# Patient Record
Sex: FEMALE | Race: WHITE | NOT HISPANIC OR LATINO | Employment: UNEMPLOYED
[De-identification: names, ages, dates, MRNs, and addresses within clinical notes are randomized per-mention and may not be internally consistent; named-entity substitution may affect disease eponyms.]

---

## 2017-11-09 ENCOUNTER — HOSPITAL (OUTPATIENT)
Dept: OTHER | Age: 47
End: 2017-11-09
Attending: FAMILY MEDICINE

## 2017-11-09 ENCOUNTER — IMAGING SERVICES (OUTPATIENT)
Dept: OTHER | Age: 47
End: 2017-11-09

## 2017-11-09 ENCOUNTER — CHARTING TRANS (OUTPATIENT)
Dept: OTHER | Age: 47
End: 2017-11-09

## 2017-11-09 ENCOUNTER — LAB SERVICES (OUTPATIENT)
Dept: OTHER | Age: 47
End: 2017-11-09

## 2017-11-09 ASSESSMENT — PAIN SCALES - GENERAL: PAINLEVEL_OUTOF10: 0

## 2017-11-10 LAB
25(OH)D3+25(OH)D2 SERPL-MCNC: 10.3 NG/ML (ref 30–100)
TSH SERPL-ACNC: 1 MCUNITS/ML (ref 0.35–5)

## 2017-11-11 LAB — TTG IGA SER IA-ACNC: 7 UNITS

## 2018-07-30 ENCOUNTER — OFFICE VISIT (OUTPATIENT)
Dept: OTOLARYNGOLOGY | Facility: CLINIC | Age: 48
End: 2018-07-30
Payer: MEDICARE

## 2018-07-30 VITALS
SYSTOLIC BLOOD PRESSURE: 101 MMHG | BODY MASS INDEX: 53.99 KG/M2 | HEIGHT: 56 IN | TEMPERATURE: 98 F | DIASTOLIC BLOOD PRESSURE: 63 MMHG | WEIGHT: 240 LBS

## 2018-07-30 DIAGNOSIS — H61.23 BILATERAL IMPACTED CERUMEN: Primary | ICD-10-CM

## 2018-07-30 PROCEDURE — 69210 REMOVE IMPACTED EAR WAX UNI: CPT | Performed by: OTOLARYNGOLOGY

## 2018-07-31 NOTE — PROGRESS NOTES
Brandy Louise is a 51year old female. Patient presents with:  Ear Problem: Bilateral ear cleaning    HPI:   She has Down syndrome with very narrow ear canals.   Hearing seems to be significantly diminished in the last few months    Current Outpatient Pr patients ears were examined under the operating microscope. Cerumen impaction was removed from bilateral ears using suction. Tympanic membranes were noted to be normal. Patient tolerated the procedure well. All questions were answered.   ASSESSMENT AND ARLENE

## 2018-11-02 VITALS
RESPIRATION RATE: 16 BRPM | HEIGHT: 56 IN | TEMPERATURE: 97.4 F | BODY MASS INDEX: 31.94 KG/M2 | DIASTOLIC BLOOD PRESSURE: 70 MMHG | HEART RATE: 61 BPM | SYSTOLIC BLOOD PRESSURE: 92 MMHG | WEIGHT: 142 LBS

## 2018-11-13 ENCOUNTER — CHARTING TRANS (OUTPATIENT)
Dept: OTHER | Age: 48
End: 2018-11-13

## 2018-11-13 ASSESSMENT — PAIN SCALES - GENERAL: PAINLEVEL_OUTOF10: 0

## 2018-12-07 VITALS
TEMPERATURE: 96.2 F | RESPIRATION RATE: 15 BRPM | HEART RATE: 75 BPM | OXYGEN SATURATION: 95 % | BODY MASS INDEX: 30.73 KG/M2 | WEIGHT: 136.6 LBS | HEIGHT: 56 IN | SYSTOLIC BLOOD PRESSURE: 98 MMHG | DIASTOLIC BLOOD PRESSURE: 68 MMHG

## 2019-01-01 ENCOUNTER — EXTERNAL RECORD (OUTPATIENT)
Dept: HEALTH INFORMATION MANAGEMENT | Facility: OTHER | Age: 49
End: 2019-01-01

## 2019-02-11 ENCOUNTER — LAB ENCOUNTER (OUTPATIENT)
Dept: LAB | Facility: HOSPITAL | Age: 49
End: 2019-02-11
Attending: FAMILY MEDICINE
Payer: MEDICARE

## 2019-02-11 ENCOUNTER — OFFICE VISIT (OUTPATIENT)
Dept: OTOLARYNGOLOGY | Facility: CLINIC | Age: 49
End: 2019-02-11
Payer: MEDICARE

## 2019-02-11 VITALS
DIASTOLIC BLOOD PRESSURE: 61 MMHG | HEIGHT: 56 IN | WEIGHT: 148 LBS | SYSTOLIC BLOOD PRESSURE: 108 MMHG | TEMPERATURE: 97 F | BODY MASS INDEX: 33.29 KG/M2

## 2019-02-11 DIAGNOSIS — H61.23 BILATERAL IMPACTED CERUMEN: Primary | ICD-10-CM

## 2019-02-11 DIAGNOSIS — J34.89 RHINORRHEA: ICD-10-CM

## 2019-02-11 DIAGNOSIS — E03.9 HYPOTHYROIDISM: Primary | ICD-10-CM

## 2019-02-11 LAB
T3FREE SERPL-MCNC: 3.42 PG/ML (ref 2.53–4.29)
T4 FREE SERPL-MCNC: 0.77 NG/DL (ref 0.58–1.64)
THYROPEROXIDASE AB SERPL-ACNC: 93.6 IU/ML (ref 0–9)
TSH SERPL-ACNC: 4.54 UIU/ML (ref 0.45–5.33)

## 2019-02-11 PROCEDURE — G0463 HOSPITAL OUTPT CLINIC VISIT: HCPCS | Performed by: OTOLARYNGOLOGY

## 2019-02-11 PROCEDURE — 86376 MICROSOMAL ANTIBODY EACH: CPT

## 2019-02-11 PROCEDURE — 86800 THYROGLOBULIN ANTIBODY: CPT

## 2019-02-11 PROCEDURE — 99213 OFFICE O/P EST LOW 20 MIN: CPT | Performed by: OTOLARYNGOLOGY

## 2019-02-11 PROCEDURE — 36415 COLL VENOUS BLD VENIPUNCTURE: CPT

## 2019-02-11 PROCEDURE — 84481 FREE ASSAY (FT-3): CPT

## 2019-02-11 PROCEDURE — 92504 EAR MICROSCOPY EXAMINATION: CPT | Performed by: OTOLARYNGOLOGY

## 2019-02-11 PROCEDURE — 84439 ASSAY OF FREE THYROXINE: CPT

## 2019-02-11 PROCEDURE — 84443 ASSAY THYROID STIM HORMONE: CPT

## 2019-02-11 NOTE — PROGRESS NOTES
Brian Sarabia is a 51year old female. Patient presents with:  Ear Wax: both ears    HPI:   Her ears seem to be blocked with wax once again. She has been having drainage from her nose which is been pretty constant.     Current Outpatient Medications:  C Posterior cervical. Supraclavicular.    Eyes Normal Conjunctiva - Right: Normal, Left: Normal. Pupil - Right: Normal, Left: Normal.    Ears Normal Inspection - Right: Normal, Left: Normal. Canal - Left: Normal. TM - Right: Normal, Left: Normal.  Ear canals

## 2019-02-14 LAB — THYROGLOBULIN AB: 68 IU/ML

## 2019-02-22 RX ORDER — QUINIDINE SULFATE 200 MG
TABLET ORAL
COMMUNITY
Start: 2018-11-28 | End: 2019-11-14 | Stop reason: DRUGHIGH

## 2019-11-14 ENCOUNTER — OFFICE VISIT (OUTPATIENT)
Dept: OTHER | Age: 49
End: 2019-11-14

## 2019-11-14 VITALS
WEIGHT: 136 LBS | HEART RATE: 84 BPM | OXYGEN SATURATION: 96 % | TEMPERATURE: 97.6 F | BODY MASS INDEX: 30.59 KG/M2 | SYSTOLIC BLOOD PRESSURE: 116 MMHG | DIASTOLIC BLOOD PRESSURE: 60 MMHG | HEIGHT: 56 IN

## 2019-11-14 DIAGNOSIS — E55.9 VITAMIN D DEFICIENCY: ICD-10-CM

## 2019-11-14 DIAGNOSIS — Z23 NEED FOR VACCINATION: ICD-10-CM

## 2019-11-14 DIAGNOSIS — I27.20 PULMONARY HYPERTENSION (CMD): ICD-10-CM

## 2019-11-14 DIAGNOSIS — Q90.9 DOWN SYNDROME: Primary | ICD-10-CM

## 2019-11-14 DIAGNOSIS — E03.8 HYPOTHYROIDISM DUE TO HASHIMOTO'S THYROIDITIS: ICD-10-CM

## 2019-11-14 DIAGNOSIS — E06.3 HYPOTHYROIDISM DUE TO HASHIMOTO'S THYROIDITIS: ICD-10-CM

## 2019-11-14 PROCEDURE — G0008 ADMIN INFLUENZA VIRUS VAC: HCPCS

## 2019-11-14 PROCEDURE — 99215 OFFICE O/P EST HI 40 MIN: CPT | Performed by: FAMILY MEDICINE

## 2019-11-14 PROCEDURE — 90686 IIV4 VACC NO PRSV 0.5 ML IM: CPT

## 2019-11-14 RX ORDER — KETOCONAZOLE 20 MG/ML
SHAMPOO TOPICAL
COMMUNITY
Start: 2016-11-08 | End: 2023-10-18 | Stop reason: DRUGHIGH

## 2019-11-14 RX ORDER — ERGOCALCIFEROL 1.25 MG/1
CAPSULE ORAL
COMMUNITY
End: 2019-11-14 | Stop reason: CLARIF

## 2019-11-14 ASSESSMENT — PATIENT HEALTH QUESTIONNAIRE - PHQ9
2. FEELING DOWN, DEPRESSED OR HOPELESS: NOT AT ALL
1. LITTLE INTEREST OR PLEASURE IN DOING THINGS: NOT AT ALL
SUM OF ALL RESPONSES TO PHQ9 QUESTIONS 1 AND 2: 0
SUM OF ALL RESPONSES TO PHQ9 QUESTIONS 1 AND 2: 0

## 2020-01-01 ENCOUNTER — EXTERNAL RECORD (OUTPATIENT)
Dept: HEALTH INFORMATION MANAGEMENT | Facility: OTHER | Age: 50
End: 2020-01-01

## 2020-01-27 ENCOUNTER — OFFICE VISIT (OUTPATIENT)
Dept: OTOLARYNGOLOGY | Facility: CLINIC | Age: 50
End: 2020-01-27
Payer: MEDICARE

## 2020-01-27 VITALS
HEIGHT: 56 IN | DIASTOLIC BLOOD PRESSURE: 60 MMHG | SYSTOLIC BLOOD PRESSURE: 98 MMHG | BODY MASS INDEX: 33.07 KG/M2 | WEIGHT: 147 LBS | TEMPERATURE: 98 F

## 2020-01-27 DIAGNOSIS — H61.23 BILATERAL IMPACTED CERUMEN: Primary | ICD-10-CM

## 2020-01-27 PROCEDURE — 69210 REMOVE IMPACTED EAR WAX UNI: CPT | Performed by: OTOLARYNGOLOGY

## 2020-01-27 NOTE — PROGRESS NOTES
Macie Bonner is a 47year old female.  Patient presents with:  Ear Problem: ear wax removal     HPI:   Her ears feel like they are blocked with wax  Current Outpatient Medications   Medication Sig Dispense Refill   • Clindamycin Phosphate 1 % External L Cerumen impaction was removed from bilateral ears using suction. Tympanic membranes were noted to be normal. Patient tolerated the procedure well. All questions were answered. ASSESSMENT AND PLAN:   1.  Bilateral impacted cerumen  Cerumen cleared bilatera

## 2020-07-10 ENCOUNTER — OFFICE VISIT (OUTPATIENT)
Dept: OTOLARYNGOLOGY | Facility: CLINIC | Age: 50
End: 2020-07-10
Payer: MEDICARE

## 2020-07-10 VITALS
HEIGHT: 56 IN | SYSTOLIC BLOOD PRESSURE: 93 MMHG | TEMPERATURE: 97 F | BODY MASS INDEX: 33.07 KG/M2 | DIASTOLIC BLOOD PRESSURE: 61 MMHG | WEIGHT: 147 LBS

## 2020-07-10 DIAGNOSIS — H61.23 BILATERAL IMPACTED CERUMEN: Primary | ICD-10-CM

## 2020-07-10 PROCEDURE — 69210 REMOVE IMPACTED EAR WAX UNI: CPT | Performed by: OTOLARYNGOLOGY

## 2020-07-11 NOTE — PROGRESS NOTES
Jarvis Roman is a 45year old female.  Patient presents with:  Ear Wax: both ears    HPI:   Her ears are very blocked up and her hearing is been very diminished over the last several weeks  Current Outpatient Medications   Medication Sig Dispense Refill tolerated the procedure well. All questions were answered. ASSESSMENT AND PLAN:   1. Bilateral impacted cerumen  Cerumen cleared bilaterally with immediate improvement in her ears.   Return as needed      The patient indicates understanding of these issue

## 2020-10-14 ENCOUNTER — HOSPITAL ENCOUNTER (OUTPATIENT)
Age: 50
Discharge: HOME | End: 2020-10-14
Payer: MEDICARE

## 2020-10-14 VITALS
RESPIRATION RATE: 16 BRPM | OXYGEN SATURATION: 100 % | SYSTOLIC BLOOD PRESSURE: 113 MMHG | HEART RATE: 68 BPM | DIASTOLIC BLOOD PRESSURE: 68 MMHG | TEMPERATURE: 98 F

## 2020-10-14 DIAGNOSIS — Z20.822 EXPOSURE TO COVID-19 VIRUS: Primary | ICD-10-CM

## 2020-10-14 DIAGNOSIS — Z20.822 ENCOUNTER FOR SCREENING LABORATORY TESTING FOR COVID-19 VIRUS IN ASYMPTOMATIC PATIENT: ICD-10-CM

## 2020-10-14 PROCEDURE — 99213 OFFICE O/P EST LOW 20 MIN: CPT | Performed by: NURSE PRACTITIONER

## 2020-10-14 PROCEDURE — 99204 OFFICE O/P NEW MOD 45 MIN: CPT | Performed by: NURSE PRACTITIONER

## 2020-10-15 NOTE — ED PROVIDER NOTES
Patient Seen in: [facility]      History   No chief complaint on file. Stated Complaint: Covid 19 test and flu shot    HPI    This is a 50-year-old female presenting for COVID-19 testing. Patient here with friend.   Fr She is alert and oriented to person, place, and time.    Psychiatric:         Mood and Affect: Mood normal.             ED Course     Labs Reviewed   2019 NOVEL CORONAVIRUS SARS-COV-2 BY PCR([facility])               MDM      Differential dx  covid 19  Exposed to

## 2020-11-18 ENCOUNTER — APPOINTMENT (OUTPATIENT)
Dept: OTHER | Age: 50
End: 2020-11-18

## 2020-12-15 ENCOUNTER — V-VISIT (OUTPATIENT)
Dept: OTHER | Age: 50
End: 2020-12-15

## 2020-12-15 ENCOUNTER — TELEPHONE (OUTPATIENT)
Dept: OTHER | Age: 50
End: 2020-12-15

## 2020-12-15 DIAGNOSIS — E03.8 HYPOTHYROIDISM DUE TO HASHIMOTO'S THYROIDITIS: ICD-10-CM

## 2020-12-15 DIAGNOSIS — Q90.9 DOWN SYNDROME: ICD-10-CM

## 2020-12-15 DIAGNOSIS — D75.89 MACROCYTOSIS: ICD-10-CM

## 2020-12-15 DIAGNOSIS — E06.3 HYPOTHYROIDISM DUE TO HASHIMOTO'S THYROIDITIS: ICD-10-CM

## 2020-12-15 DIAGNOSIS — E55.9 VITAMIN D DEFICIENCY: Primary | ICD-10-CM

## 2020-12-15 PROCEDURE — 99215 OFFICE O/P EST HI 40 MIN: CPT | Performed by: FAMILY MEDICINE

## 2020-12-15 RX ORDER — FLUTICASONE PROPIONATE 50 MCG
SPRAY, SUSPENSION (ML) NASAL
COMMUNITY
End: 2023-10-18 | Stop reason: DRUGHIGH

## 2020-12-15 SDOH — HEALTH STABILITY: MENTAL HEALTH: HOW OFTEN DO YOU HAVE A DRINK CONTAINING ALCOHOL?: NEVER

## 2020-12-15 ASSESSMENT — PATIENT HEALTH QUESTIONNAIRE - PHQ9
1. LITTLE INTEREST OR PLEASURE IN DOING THINGS: NOT AT ALL
SUM OF ALL RESPONSES TO PHQ9 QUESTIONS 1 AND 2: 0
CLINICAL INTERPRETATION OF PHQ2 SCORE: NO FURTHER SCREENING NEEDED
2. FEELING DOWN, DEPRESSED OR HOPELESS: NOT AT ALL
SUM OF ALL RESPONSES TO PHQ9 QUESTIONS 1 AND 2: 0
CLINICAL INTERPRETATION OF PHQ9 SCORE: NO FURTHER SCREENING NEEDED

## 2020-12-21 ENCOUNTER — TELEPHONE (OUTPATIENT)
Dept: OTHER | Age: 50
End: 2020-12-21

## 2021-01-01 ENCOUNTER — EXTERNAL RECORD (OUTPATIENT)
Dept: OTHER | Age: 51
End: 2021-01-01

## 2021-03-17 ENCOUNTER — TELEPHONE (OUTPATIENT)
Dept: OTHER | Age: 51
End: 2021-03-17

## 2021-06-18 LAB
25(OH)D3+25(OH)D2 SERPL-MCNC: 39.3 NG/ML (ref 30–100)
ALBUMIN SERPL-MCNC: 3.5 G/DL (ref 3.5–5.7)
ALP SERPL-CCNC: 50 U/L (ref 34–104)
ALT SERPL-CCNC: 13 U/L (ref 7–52)
ANION GAP SERPL CALC-SCNC: 4.8 MEQ/L (ref 6.2–14.7)
AST SERPL-CCNC: 17 U/L (ref 13–39)
BILIRUB SERPL-MCNC: 0.3 MG/DL (ref 0–1)
BUN SERPL-MCNC: 17 MG/DL (ref 7–25)
CALCIUM SERPL-MCNC: 8.5 MG/DL (ref 8.6–10.3)
CHLORIDE SERPL-SCNC: 101 MEQ/L (ref 98–107)
CO2 SERPL-SCNC: 31.2 MEQ/L (ref 21–31)
CREAT SERPL-MCNC: 0.8 MG/DL (ref 0.6–1.2)
FOLATE SERPL-MCNC: 18.1 NG/ML
GLUCOSE SERPL-MCNC: 78 MG/DL (ref 70–99)
LENGTH OF FAST TIME PATIENT: ABNORMAL H
POTASSIUM SERPL-SCNC: 4.4 MEQ/L (ref 3.5–5.2)
PROT SERPL-MCNC: 6.9 G/DL (ref 6.4–8.9)
SODIUM SERPL-SCNC: 137 MEQ/L (ref 133–144)
TSH SERPL-ACNC: 0.15 UIU/ML (ref 0.27–4.2)
VIT B12 SERPL-MCNC: 309 PG/ML (ref 180–914)

## 2021-06-21 ENCOUNTER — TELEPHONE (OUTPATIENT)
Dept: OTHER | Age: 51
End: 2021-06-21

## 2021-06-21 DIAGNOSIS — E05.90 HYPERTHYROIDISM: Primary | ICD-10-CM

## 2021-06-24 ENCOUNTER — TELEPHONE (OUTPATIENT)
Dept: OTHER | Age: 51
End: 2021-06-24

## 2021-09-01 PROBLEM — E06.1 THYROIDITIS, SUBACUTE: Status: ACTIVE | Noted: 2021-09-01

## 2021-09-02 ENCOUNTER — LAB ENCOUNTER (OUTPATIENT)
Dept: LAB | Facility: HOSPITAL | Age: 51
End: 2021-09-02
Attending: INTERNAL MEDICINE
Payer: MEDICARE

## 2021-09-02 DIAGNOSIS — E06.3 HASHIMOTO'S DISEASE: Primary | ICD-10-CM

## 2021-09-02 LAB
T3FREE SERPL-MCNC: 2.71 PG/ML (ref 2.4–4.2)
T4 FREE SERPL-MCNC: 0.8 NG/DL (ref 0.8–1.7)
TSI SER-ACNC: 4.08 MIU/ML (ref 0.36–3.74)

## 2021-09-02 PROCEDURE — 84481 FREE ASSAY (FT-3): CPT

## 2021-09-02 PROCEDURE — 84439 ASSAY OF FREE THYROXINE: CPT

## 2021-09-02 PROCEDURE — 36415 COLL VENOUS BLD VENIPUNCTURE: CPT

## 2021-09-02 PROCEDURE — 84443 ASSAY THYROID STIM HORMONE: CPT

## 2021-10-21 ENCOUNTER — LAB ENCOUNTER (OUTPATIENT)
Dept: LAB | Facility: HOSPITAL | Age: 51
End: 2021-10-21
Attending: INTERNAL MEDICINE
Payer: MEDICARE

## 2021-10-21 DIAGNOSIS — R94.6 NONSPECIFIC ABNORMAL RESULTS OF THYROID FUNCTION STUDY: Primary | ICD-10-CM

## 2021-10-21 PROCEDURE — 84443 ASSAY THYROID STIM HORMONE: CPT

## 2021-10-21 PROCEDURE — 84439 ASSAY OF FREE THYROXINE: CPT

## 2021-10-21 PROCEDURE — 84481 FREE ASSAY (FT-3): CPT

## 2021-10-21 PROCEDURE — 36415 COLL VENOUS BLD VENIPUNCTURE: CPT

## 2022-06-01 ENCOUNTER — TELEPHONE (OUTPATIENT)
Dept: OTHER | Age: 52
End: 2022-06-01

## 2022-06-01 DIAGNOSIS — U07.1 COVID-19 VIRUS INFECTION: Primary | ICD-10-CM

## 2022-06-03 PROBLEM — U07.1 COVID-19 VIRUS INFECTION: Status: ACTIVE | Noted: 2022-06-03

## 2022-06-16 ENCOUNTER — HOSPITAL ENCOUNTER (OUTPATIENT)
Dept: GENERAL RADIOLOGY | Facility: HOSPITAL | Age: 52
Discharge: HOME | End: 2022-06-16
Attending: FAMILY MEDICINE
Payer: MEDICARE

## 2022-06-16 DIAGNOSIS — M25.512 PAIN IN LEFT SHOULDER: ICD-10-CM

## 2022-06-16 PROCEDURE — 73030 X-RAY EXAM OF SHOULDER: CPT | Performed by: FAMILY MEDICINE

## 2022-10-12 ENCOUNTER — TELEPHONE (OUTPATIENT)
Dept: OTHER | Age: 52
End: 2022-10-12

## 2022-10-12 ENCOUNTER — OFFICE VISIT (OUTPATIENT)
Dept: OTHER | Age: 52
End: 2022-10-12

## 2022-10-12 VITALS
TEMPERATURE: 98.2 F | BODY MASS INDEX: 24.12 KG/M2 | HEART RATE: 62 BPM | OXYGEN SATURATION: 99 % | HEIGHT: 56 IN | WEIGHT: 107.2 LBS | SYSTOLIC BLOOD PRESSURE: 98 MMHG | DIASTOLIC BLOOD PRESSURE: 60 MMHG

## 2022-10-12 DIAGNOSIS — H60.332 ACUTE SWIMMER'S EAR OF LEFT SIDE: ICD-10-CM

## 2022-10-12 DIAGNOSIS — D75.89 MACROCYTOSIS: ICD-10-CM

## 2022-10-12 DIAGNOSIS — E03.8 HYPOTHYROIDISM DUE TO HASHIMOTO'S THYROIDITIS: ICD-10-CM

## 2022-10-12 DIAGNOSIS — Q90.9 DOWN SYNDROME: Primary | ICD-10-CM

## 2022-10-12 DIAGNOSIS — E06.3 HYPOTHYROIDISM DUE TO HASHIMOTO'S THYROIDITIS: ICD-10-CM

## 2022-10-12 DIAGNOSIS — E55.9 VITAMIN D DEFICIENCY: ICD-10-CM

## 2022-10-12 DIAGNOSIS — H72.90 PERFORATION OF TYMPANIC MEMBRANE, UNSPECIFIED LATERALITY: ICD-10-CM

## 2022-10-12 DIAGNOSIS — H60.332 ACUTE SWIMMER'S EAR OF LEFT SIDE: Primary | ICD-10-CM

## 2022-10-12 DIAGNOSIS — H61.23 BILATERAL IMPACTED CERUMEN: ICD-10-CM

## 2022-10-12 PROBLEM — U07.1 COVID-19 VIRUS INFECTION: Status: RESOLVED | Noted: 2022-06-03 | Resolved: 2022-10-12

## 2022-10-12 PROCEDURE — 99215 OFFICE O/P EST HI 40 MIN: CPT | Performed by: FAMILY MEDICINE

## 2022-10-12 PROCEDURE — 69209 REMOVE IMPACTED EAR WAX UNI: CPT | Performed by: FAMILY MEDICINE

## 2022-10-12 RX ORDER — CIPROFLOXACIN/HYDROCORTISONE 0.2 %-1 %
3 SUSPENSION, DROPS(FINAL DOSAGE FORM)(ML) OTIC (EAR) 2 TIMES DAILY
Qty: 10 ML | Refills: 0 | Status: SHIPPED | OUTPATIENT
Start: 2022-10-12 | End: 2022-10-12 | Stop reason: DRUGHIGH

## 2022-10-12 RX ORDER — CIPROFLOXACIN AND DEXAMETHASONE 3; 1 MG/ML; MG/ML
4 SUSPENSION/ DROPS AURICULAR (OTIC) 2 TIMES DAILY
Qty: 7.5 ML | Refills: 0 | Status: SHIPPED | OUTPATIENT
Start: 2022-10-12 | End: 2022-10-17

## 2022-11-29 ENCOUNTER — APPOINTMENT (OUTPATIENT)
Dept: CT IMAGING | Facility: HOSPITAL | Age: 52
End: 2022-11-29
Attending: EMERGENCY MEDICINE
Payer: MEDICARE

## 2022-11-29 ENCOUNTER — APPOINTMENT (OUTPATIENT)
Dept: CV DIAGNOSTICS | Facility: HOSPITAL | Age: 52
End: 2022-11-29
Attending: HOSPITALIST
Payer: MEDICARE

## 2022-11-29 ENCOUNTER — HOSPITAL ENCOUNTER (OUTPATIENT)
Facility: HOSPITAL | Age: 52
Setting detail: OBSERVATION
Discharge: HOME | End: 2022-11-30
Attending: EMERGENCY MEDICINE | Admitting: HOSPITALIST
Payer: MEDICARE

## 2022-11-29 ENCOUNTER — TELEPHONE (OUTPATIENT)
Dept: OTHER | Age: 52
End: 2022-11-29

## 2022-11-29 ENCOUNTER — APPOINTMENT (OUTPATIENT)
Dept: ULTRASOUND IMAGING | Facility: HOSPITAL | Age: 52
End: 2022-11-29
Attending: HOSPITALIST
Payer: MEDICARE

## 2022-11-29 ENCOUNTER — APPOINTMENT (OUTPATIENT)
Dept: GENERAL RADIOLOGY | Facility: HOSPITAL | Age: 52
End: 2022-11-29
Attending: EMERGENCY MEDICINE
Payer: MEDICARE

## 2022-11-29 DIAGNOSIS — R55 SYNCOPE, NEAR: Primary | ICD-10-CM

## 2022-11-29 LAB
ANION GAP SERPL CALC-SCNC: 4 MMOL/L (ref 0–18)
ATRIAL RATE: 62 BPM
BASOPHILS # BLD AUTO: 0.06 X10(3) UL (ref 0–0.2)
BASOPHILS NFR BLD AUTO: 1 %
BUN BLD-MCNC: 15 MG/DL (ref 7–18)
BUN/CREAT SERPL: 20 (ref 10–20)
CALCIUM BLD-MCNC: 8.6 MG/DL (ref 8.5–10.1)
CHLORIDE SERPL-SCNC: 102 MMOL/L (ref 98–112)
CO2 SERPL-SCNC: 32 MMOL/L (ref 21–32)
CREAT BLD-MCNC: 0.75 MG/DL
DEPRECATED RDW RBC AUTO: 46.5 FL (ref 35.1–46.3)
EOSINOPHIL # BLD AUTO: 0.08 X10(3) UL (ref 0–0.7)
EOSINOPHIL NFR BLD AUTO: 1.3 %
ERYTHROCYTE [DISTWIDTH] IN BLOOD BY AUTOMATED COUNT: 14.3 % (ref 11–15)
GFR SERPLBLD BASED ON 1.73 SQ M-ARVRAT: 95 ML/MIN/1.73M2 (ref 60–?)
GLUCOSE BLD-MCNC: 90 MG/DL (ref 70–99)
HCT VFR BLD AUTO: 39 %
HGB BLD-MCNC: 12.6 G/DL
IMM GRANULOCYTES # BLD AUTO: 0.01 X10(3) UL (ref 0–1)
IMM GRANULOCYTES NFR BLD: 0.2 %
LYMPHOCYTES # BLD AUTO: 2.44 X10(3) UL (ref 1–4)
LYMPHOCYTES NFR BLD AUTO: 39 %
MAGNESIUM SERPL-MCNC: 2.2 MG/DL (ref 1.6–2.6)
MCH RBC QN AUTO: 28.8 PG (ref 26–34)
MCHC RBC AUTO-ENTMCNC: 32.3 G/DL (ref 31–37)
MCV RBC AUTO: 89 FL
MONOCYTES # BLD AUTO: 0.89 X10(3) UL (ref 0.1–1)
MONOCYTES NFR BLD AUTO: 14.2 %
NEUTROPHILS # BLD AUTO: 2.78 X10 (3) UL (ref 1.5–7.7)
NEUTROPHILS # BLD AUTO: 2.78 X10(3) UL (ref 1.5–7.7)
NEUTROPHILS NFR BLD AUTO: 44.3 %
OSMOLALITY SERPL CALC.SUM OF ELEC: 286 MOSM/KG (ref 275–295)
P AXIS: 40 DEGREES
P-R INTERVAL: 154 MS
PLATELET # BLD AUTO: 225 10(3)UL (ref 150–450)
POTASSIUM SERPL-SCNC: 3.7 MMOL/L (ref 3.5–5.1)
Q-T INTERVAL: 424 MS
QRS DURATION: 84 MS
QTC CALCULATION (BEZET): 430 MS
R AXIS: 44 DEGREES
RBC # BLD AUTO: 4.38 X10(6)UL
SARS-COV-2 RNA RESP QL NAA+PROBE: NOT DETECTED
SODIUM SERPL-SCNC: 138 MMOL/L (ref 136–145)
T AXIS: 34 DEGREES
T4 FREE SERPL-MCNC: 1.1 NG/DL (ref 0.8–1.7)
TROPONIN I HIGH SENSITIVITY: 7 NG/L
TSI SER-ACNC: 5.23 MIU/ML (ref 0.36–3.74)
VENTRICULAR RATE: 62 BPM
WBC # BLD AUTO: 6.3 X10(3) UL (ref 4–11)

## 2022-11-29 PROCEDURE — 93880 EXTRACRANIAL BILAT STUDY: CPT | Performed by: HOSPITALIST

## 2022-11-29 PROCEDURE — 70450 CT HEAD/BRAIN W/O DYE: CPT | Performed by: EMERGENCY MEDICINE

## 2022-11-29 PROCEDURE — 73560 X-RAY EXAM OF KNEE 1 OR 2: CPT | Performed by: EMERGENCY MEDICINE

## 2022-11-29 PROCEDURE — 72125 CT NECK SPINE W/O DYE: CPT | Performed by: EMERGENCY MEDICINE

## 2022-11-29 PROCEDURE — 99219 INITIAL OBSERVATION CARE,LEVL II: CPT | Performed by: HOSPITALIST

## 2022-11-29 RX ORDER — HEPARIN SODIUM 5000 [USP'U]/ML
5000 INJECTION, SOLUTION INTRAVENOUS; SUBCUTANEOUS EVERY 12 HOURS SCHEDULED
Status: DISCONTINUED | OUTPATIENT
Start: 2022-11-29 | End: 2022-11-30

## 2022-11-29 RX ORDER — ACETAMINOPHEN 500 MG
500 TABLET ORAL EVERY 4 HOURS PRN
Status: DISCONTINUED | OUTPATIENT
Start: 2022-11-29 | End: 2022-11-30

## 2022-11-29 RX ORDER — ONDANSETRON 2 MG/ML
4 INJECTION INTRAMUSCULAR; INTRAVENOUS EVERY 6 HOURS PRN
Status: DISCONTINUED | OUTPATIENT
Start: 2022-11-29 | End: 2022-11-30

## 2022-11-29 NOTE — ED QUICK NOTES
Pt to the ed today after having an 2 falls this morning one unwitnessed fall and one witnessed fall. Per family pt was holding onto her bed and fell to the ground. Family unsure if pt hit her head. Pt complains of headache after her witnessed fall.

## 2022-11-29 NOTE — ED INITIAL ASSESSMENT (HPI)
Pt to ED with aunt via ambulance from home where she heard her fall and then she fell again 30 mins later witnessed by aunt pt fell backwards after walking out of bathroom and losing balance, aunt said she looked dizzy/ didn't look right and has been weak since this morning and has had a cold x past month with runny nose, no fevers. AXOX2 which is baseline hx down syndrome. No blood thinners.

## 2022-11-29 NOTE — H&P
[facility]    PATIENT'S NAME: Salima Gonzalez   ATTENDING PHYSICIAN: Carolee Silvestre MD   PATIENT ACCOUNT#:   [de-identified]    LOCATION:  [address]  MEDICAL RECORD #:   [id number]       YOB: 1970  ADMISSION DATE:       11/29/2022    HISTORY AND PHYSICAL EXAMINATION    CHIEF COMPLAINT:  Presyncope. HISTORY OF PRESENT ILLNESS:  The patient is a 48-year-old [facility] female with Down syndrome who was noted by her family to be dizzy and collapse to the floor earlier today without losing her consciousness completely. Brought into the emergency department for evaluation. CBC and chemistry were unremarkable. CT scan of the brain and CT scan of cervical spine were unremarkable. TSH still pending. Magnesium 2.2. Troponin was negative. PAST MEDICAL HISTORY:  Down syndrome. No other medical problems per the family. Reviewing the record, the patient had a 2D echocardiogram with Doppler in 2010 which showed moderate to severe pulmonary artery hypertension with dilated right ventricle. PAST SURGICAL HISTORY:  Family is not aware of any past surgical history. MEDICATIONS:  Currently none. ALLERGIES:  No known drug allergies. She is allergic to fish-derivative products. FAMILY HISTORY:  Positive for hypertension. Her aunt who lives with her had sustained a stroke. REVIEW OF SYSTEMS:  Unable to obtain from the patient herself, but per the family, from time to time the patient complains of lightheadedness when she stands up quickly. She does not take any medications. Today, after waking up she felt lightheaded and collapsed without losing her consciousness completely. No vomiting. No diaphoresis. No other complaints. The patient ate breakfast in the emergency room, and she feels okay now. Other 12-point review of systems unobtainable. PHYSICAL EXAMINATION:    GENERAL:  Alert, able to answer very simple questions, able to follow commands.   VITAL SIGNS:  Temperature 97.9, pulse 62, respiratory rate 16, blood pressure 120/63, pulse ox 100% on room air. HEENT:  Atraumatic. Oropharynx clear. Moist mucous membranes. Ears, nose normal.  Eyes:  Anicteric sclerae. NECK:  Supple. No lymphadenopathy. Trachea midline. Full range of motion. LUNGS:  Chest clear to auscultation bilaterally. Normal respiratory effort. HEART:  Regular rate and rhythm. S1 and S2 auscultated. Systolic murmur best heard at the apex and right second intercostal space. ABDOMEN:  Soft, nondistended. No tenderness. Positive bowel sounds. EXTREMITIES:  No peripheral edema, clubbing, or cyanosis. NEUROLOGIC:  Motor and sensory intact. Cranial nerves II-XII are intact. ASSESSMENT AND PLAN:    1. Presyncope with prior echocardiogram showing severe pulmonary artery hypertension. 2.   Down syndrome. The patient will be admitted to telemetry floor for observation. Fall precautions. Obtain 2D echocardiogram with Doppler and carotid ultrasound. Monitor rhythm. Obtain Cardiology consult. Further recommendations to follow.     Dictated By Radha Celestin MD  d: 11/29/2022 11:34:56  t: 11/29/2022 13:17:31  Job [id number]  DV/

## 2022-11-30 ENCOUNTER — APPOINTMENT (OUTPATIENT)
Dept: CV DIAGNOSTICS | Facility: HOSPITAL | Age: 52
End: 2022-11-30
Attending: HOSPITALIST
Payer: MEDICARE

## 2022-11-30 VITALS
BODY MASS INDEX: 21.14 KG/M2 | WEIGHT: 104.88 LBS | SYSTOLIC BLOOD PRESSURE: 89 MMHG | OXYGEN SATURATION: 100 % | DIASTOLIC BLOOD PRESSURE: 31 MMHG | TEMPERATURE: 97 F | HEART RATE: 88 BPM | HEIGHT: 59 IN | RESPIRATION RATE: 12 BRPM

## 2022-11-30 LAB
ANION GAP SERPL CALC-SCNC: 4 MMOL/L (ref 0–18)
BASOPHILS # BLD AUTO: 0.05 X10(3) UL (ref 0–0.2)
BASOPHILS NFR BLD AUTO: 1.1 %
BUN BLD-MCNC: 15 MG/DL (ref 7–18)
BUN/CREAT SERPL: 20.8 (ref 10–20)
CALCIUM BLD-MCNC: 8.3 MG/DL (ref 8.5–10.1)
CHLORIDE SERPL-SCNC: 105 MMOL/L (ref 98–112)
CO2 SERPL-SCNC: 32 MMOL/L (ref 21–32)
CREAT BLD-MCNC: 0.72 MG/DL
DEPRECATED RDW RBC AUTO: 47.9 FL (ref 35.1–46.3)
EOSINOPHIL # BLD AUTO: 0.08 X10(3) UL (ref 0–0.7)
EOSINOPHIL NFR BLD AUTO: 1.8 %
ERYTHROCYTE [DISTWIDTH] IN BLOOD BY AUTOMATED COUNT: 14.6 % (ref 11–15)
GFR SERPLBLD BASED ON 1.73 SQ M-ARVRAT: 100 ML/MIN/1.73M2 (ref 60–?)
GLUCOSE BLD-MCNC: 82 MG/DL (ref 70–99)
HCT VFR BLD AUTO: 40.8 %
HGB BLD-MCNC: 12.9 G/DL
IMM GRANULOCYTES # BLD AUTO: 0 X10(3) UL (ref 0–1)
IMM GRANULOCYTES NFR BLD: 0 %
LYMPHOCYTES # BLD AUTO: 2.4 X10(3) UL (ref 1–4)
LYMPHOCYTES NFR BLD AUTO: 53.2 %
MAGNESIUM SERPL-MCNC: 2 MG/DL (ref 1.6–2.6)
MCH RBC QN AUTO: 28.4 PG (ref 26–34)
MCHC RBC AUTO-ENTMCNC: 31.6 G/DL (ref 31–37)
MCV RBC AUTO: 89.9 FL
MONOCYTES # BLD AUTO: 0.55 X10(3) UL (ref 0.1–1)
MONOCYTES NFR BLD AUTO: 12.2 %
NEUTROPHILS # BLD AUTO: 1.43 X10 (3) UL (ref 1.5–7.7)
NEUTROPHILS # BLD AUTO: 1.43 X10(3) UL (ref 1.5–7.7)
NEUTROPHILS NFR BLD AUTO: 31.7 %
OSMOLALITY SERPL CALC.SUM OF ELEC: 292 MOSM/KG (ref 275–295)
PLATELET # BLD AUTO: 235 10(3)UL (ref 150–450)
POTASSIUM SERPL-SCNC: 3.7 MMOL/L (ref 3.5–5.1)
RBC # BLD AUTO: 4.54 X10(6)UL
SODIUM SERPL-SCNC: 141 MMOL/L (ref 136–145)
WBC # BLD AUTO: 4.5 X10(3) UL (ref 4–11)

## 2022-11-30 PROCEDURE — 93306 TTE W/DOPPLER COMPLETE: CPT | Performed by: HOSPITALIST

## 2022-11-30 PROCEDURE — 99217 OBSERVATION CARE DISCHARGE: CPT | Performed by: HOSPITALIST

## 2022-11-30 RX ORDER — MIDODRINE HYDROCHLORIDE 2.5 MG/1
2.5 TABLET ORAL 3 TIMES DAILY
Status: DISCONTINUED | OUTPATIENT
Start: 2022-11-30 | End: 2022-11-30

## 2022-11-30 RX ORDER — MIDODRINE HYDROCHLORIDE 2.5 MG/1
2.5 TABLET ORAL ONCE
Status: COMPLETED | OUTPATIENT
Start: 2022-11-30 | End: 2022-11-30

## 2022-11-30 RX ORDER — MIDODRINE HYDROCHLORIDE 2.5 MG/1
2.5 TABLET ORAL 3 TIMES DAILY
Qty: 90 TABLET | Refills: 1 | Status: SHIPPED | OUTPATIENT
Start: 2022-11-30 | End: 2022-11-30

## 2022-11-30 NOTE — PLAN OF CARE
Problem: Patient Centered Care  Goal: Patient preferences are identified and integrated in the patient's plan of care  Description: Interventions:  - What would you like us to know as we care for you? I live with my aunt    - Provide timely, complete, and accurate information to patient/family  - Incorporate patient and family knowledge, values, beliefs, and cultural backgrounds into the planning and delivery of care  - Encourage patient/family to participate in care and decision-making at the level they choose  - Honor patient and family perspectives and choices  Outcome: Adequate for Discharge     Problem: SAFETY ADULT - FALL  Goal: Free from fall injury  Description: INTERVENTIONS:  - Assess pt frequently for physical needs  - Identify cognitive and physical deficits and behaviors that affect risk of falls.   - [place] fall precautions as indicated by assessment.  - Educate pt/family on patient safety including physical limitations  - Instruct pt to call for assistance with activity based on assessment  - Modify environment to reduce risk of injury  - Provide assistive devices as appropriate  - Consider OT/PT consult to assist with strengthening/mobility  - Encourage toileting schedule  Outcome: Adequate for Discharge     Problem: PAIN - ADULT  Goal: Verbalizes/displays adequate comfort level or patient's stated pain goal  Description: INTERVENTIONS:  - Encourage pt to monitor pain and request assistance  - Assess pain using appropriate pain scale  - Administer analgesics based on type and severity of pain and evaluate response  - Implement non-pharmacological measures as appropriate and evaluate response  - Consider cultural and social influences on pain and pain management  - Manage/alleviate anxiety  - Utilize distraction and/or relaxation techniques  - Monitor for opioid side effects  - Notify MD/LIP if interventions unsuccessful or patient reports new pain  - Anticipate increased pain with activity and pre-medicate as appropriate  Outcome: Adequate for Discharge     Problem: RISK FOR INFECTION - ADULT  Goal: Absence of fever/infection during anticipated neutropenic period  Description: INTERVENTIONS  - Monitor WBC  - Administer growth factors as ordered  - Implement neutropenic guidelines  Outcome: Adequate for Discharge     Problem: DISCHARGE PLANNING  Goal: Discharge to home or other facility with appropriate resources  Description: INTERVENTIONS:  - Identify barriers to discharge w/pt and caregiver  - Include patient/family/discharge partner in discharge planning  - Arrange for needed discharge resources and transportation as appropriate  - Identify discharge learning needs (meds, wound care, etc)  - Arrange for interpreters to assist at discharge as needed  - Consider post-discharge preferences of patient/family/discharge partner  - Complete POLST form as appropriate  - Assess patient's ability to be responsible for managing their own health  - Refer to Case Management Department for coordinating discharge planning if the patient needs post-hospital services based on physician/LIP order or complex needs related to functional status, cognitive ability or social support system  Outcome: Adequate for Discharge     Problem: Altered Communication/Language Barrier  Goal: Patient/Family is able to understand and participate in their care  Description: Interventions:  - Assess communication ability and preferred communication style  - Implement communication aides and strategies  - Use visual cues when possible  - Listen attentively, be patient, do not interrupt  - Minimize distractions  - Allow time for understanding and response  - Establish method for patient to ask for assistance (call light)  - Provide an  as needed  - Communicate barriers and strategies to overcome with those who interact with patient  Outcome: Adequate for Discharge   Pt discharged to home.  RN discussed meds and follow-up with pt's family. She verbalized understanding. No prescriptions. IV removed. Pt's family will provide transportation home. Pt assisted safely off unit.

## 2022-11-30 NOTE — PLAN OF CARE
Patient newly admitted, awaiting ultrasound. Fall precautions maintained- bed alarm on, bed locked in lowest position, call light and personal belongings within reach, non-skid socks in place to bilateral feet. Problem: Patient Centered Care  Goal: Patient preferences are identified and integrated in the patient's plan of care  Description: Interventions:  - What would you like us to know as we care for you?  I live with my aunt    - Provide timely, complete, and accurate information to patient/family  - Incorporate patient and family knowledge, values, beliefs, and cultural backgrounds into the planning and delivery of care  - Encourage patient/family to participate in care and decision-making at the level they choose  - Honor patient and family perspectives and choices  Outcome: Progressing     Problem: Patient/Family Goals  Goal: Patient/Family Long Term Goal  Description: Patient's Long Term Goal:     Interventions:  -   - See additional Care Plan goals for specific interventions  Outcome: Progressing  Goal: Patient/Family Short Term Goal  Description: Patient's Short Term Goal:     Interventions:   -   - See additional Care Plan goals for specific interventions  Outcome: Progressing

## 2022-11-30 NOTE — PLAN OF CARE
Problem: Patient Centered Care  Goal: Patient preferences are identified and integrated in the patient's plan of care  Description: Interventions:  - What would you like us to know as we care for you? I live with my aunt    - Provide timely, complete, and accurate information to patient/family  - Incorporate patient and family knowledge, values, beliefs, and cultural backgrounds into the planning and delivery of care  - Encourage patient/family to participate in care and decision-making at the level they choose  - Honor patient and family perspectives and choices  Outcome: Progressing     Problem: Patient/Family Goals  Goal: Patient/Family Long Term Goal  Description: Patient's Long Term Goal: return home     Interventions:  - observations   - See additional Care Plan goals for specific interventions  Outcome: Progressing  Goal: Patient/Family Short Term Goal  Description: Patient's Short Term Goal:     Interventions:   -   - See additional Care Plan goals for specific interventions  Outcome: Progressing     Problem: SAFETY ADULT - FALL  Goal: Free from fall injury  Description: INTERVENTIONS:  - Assess pt frequently for physical needs  - Identify cognitive and physical deficits and behaviors that affect risk of falls.   - [place] fall precautions as indicated by assessment.  - Educate pt/family on patient safety including physical limitations  - Instruct pt to call for assistance with activity based on assessment  - Modify environment to reduce risk of injury  - Provide assistive devices as appropriate  - Consider OT/PT consult to assist with strengthening/mobility  - Encourage toileting schedule  Outcome: Progressing     Problem: PAIN - ADULT  Goal: Verbalizes/displays adequate comfort level or patient's stated pain goal  Description: INTERVENTIONS:  - Encourage pt to monitor pain and request assistance  - Assess pain using appropriate pain scale  - Administer analgesics based on type and severity of pain and evaluate response  - Implement non-pharmacological measures as appropriate and evaluate response  - Consider cultural and social influences on pain and pain management  - Manage/alleviate anxiety  - Utilize distraction and/or relaxation techniques  - Monitor for opioid side effects  - Notify MD/LIP if interventions unsuccessful or patient reports new pain  - Anticipate increased pain with activity and pre-medicate as appropriate  Outcome: Progressing     Problem: RISK FOR INFECTION - ADULT  Goal: Absence of fever/infection during anticipated neutropenic period  Description: INTERVENTIONS  - Monitor WBC  - Administer growth factors as ordered  - Implement neutropenic guidelines  Outcome: Progressing     Problem: DISCHARGE PLANNING  Goal: Discharge to home or other facility with appropriate resources  Description: INTERVENTIONS:  - Identify barriers to discharge w/pt and caregiver  - Include patient/family/discharge partner in discharge planning  - Arrange for needed discharge resources and transportation as appropriate  - Identify discharge learning needs (meds, wound care, etc)  - Arrange for interpreters to assist at discharge as needed  - Consider post-discharge preferences of patient/family/discharge partner  - Complete POLST form as appropriate  - Assess patient's ability to be responsible for managing their own health  - Refer to Case Management Department for coordinating discharge planning if the patient needs post-hospital services based on physician/LIP order or complex needs related to functional status, cognitive ability or social support system  Outcome: Progressing     Problem: Altered Communication/Language Barrier  Goal: Patient/Family is able to understand and participate in their care  Description: Interventions:  - Assess communication ability and preferred communication style  - Implement communication aides and strategies  - Use visual cues when possible  - Listen attentively, be patient, do not interrupt  - Minimize distractions  - Allow time for understanding and response  - Establish method for patient to ask for assistance (call light)  - Provide an  as needed  - Communicate barriers and strategies to overcome with those who interact with patient  Outcome: Progressing   No change in patient status, vital signs stable, fall precautions in place

## 2022-11-30 NOTE — DISCHARGE PLANNING
MDO for discharge today. Per primary RN, pt to be trialed on midodrine prior to discharge - cardiology will sign off if tolerating. Patient chart reviewed for discharge: Medication Reconciliation completed, Specialist/PCP follow up listed, and disease specific Instructions/Education included in After Visit Summary. Discharge RN notified patient's RN of AVS completion and verified all consultants have signed off. Patient's RN to notify DC RN if discharge status changes.       Lilo Cheng RN, Discharge Leader

## 2022-12-01 ENCOUNTER — TELEPHONE (OUTPATIENT)
Dept: OTHER | Age: 52
End: 2022-12-01

## 2022-12-04 NOTE — DISCHARGE SUMMARY
[facility] - [facility]    Discharge Summary    [address] [facility] Patient Status:  Observation    1970 MRN [id number]   Location [facility] 5SW/SE Attending No att. providers found   Hosp Day # 0 PCP Marcela Koenig MD     Date of Admission: 2022 Disposition: Home or Self Care     Date of Discharge: 22    Admitting Diagnosis: Syncope, near [facility]    Hospital Discharge Diagnoses: syncope    Lace+ Score: 31  59-90 High Risk  29-58 Medium Risk  0-28   Low Risk. TCM Follow-Up Recommendation:  LACE 29-58: Moderate Risk of readmission after discharge from the hospital.      Problem List: Patient Active Problem List:     Syncope, near      Reason for Admission:   Near Syncope    Physical Exam:   General appearance: alert, appears stated age and cooperative  Pulmonary:  clear to auscultation bilaterally  Cardiovascular: S1, S2 normal, no murmur, click, rub or gallop, regular rate and rhythm  Abdominal: soft, non-tender; bowel sounds normal; no masses,  no organomegaly  Extremities: extremities normal, atraumatic, no cyanosis or edema  Psychiatric: calm      History of Present Illness:   Per Dr. Terrence Alvarez  The patient is a 79-year-old  female with Down syndrome who was noted by her family to be dizzy and collapse to the floor earlier today without losing her consciousness completely. Brought into the emergency department for evaluation. CBC and chemistry were unremarkable. CT scan of the brain and CT scan of cervical spine were unremarkable. TSH still pending. Magnesium 2.2. Troponin was negative. Hospital Course:   Dizziness  Syncope  -improved  -encourgaged ivf  -Orthostatics +, ~ 10 pt difference sitting to standing   -No events on tele overnight and this AM  -US carotid : normal   -CT brain no acute process   -Echo   -TTE from  EF of  showed normal pulm htn with mild TR   -hx.  Of severe pulm htn, with PASP 70mmhg and mod TR on  report now improved  -plan home with op f/u    Consultations:   cardiolgoy    Procedures: n/a    Complications: n/a    Discharge Condition: Good    Discharge Medications:      Discharge Medications      CONTINUE taking these medications      Instructions Prescription details   clindamycin 1 % Lotn  Commonly known as: Clindagel      APPLY SPARINGLY TO AFFECTED AREA(S) OF PIMPLES/BOILS TWICE DAILY   Refills: 11     ketoconazole 2 % Sham  Commonly known as: Nizoral      APPLY TO SCALP TWO TIMES PER WEEK. LEAVE IN FOR 5 MINUTES THEN RINSE OUT   Refills: 11     Vitamin D3 10 MCG (400 UNIT) Caps      Take by mouth. Refills: 0            Follow up Visits:  Follow-up with pcp in 1 week    Follow up Labs: n/a     Other Discharge Instructions: stay hydrated    Darryle Apt, MD  12/4/2022  7:56 AM    > 35 min

## 2023-04-19 ENCOUNTER — LAB ENCOUNTER (OUTPATIENT)
Dept: LAB | Facility: HOSPITAL | Age: 53
End: 2023-04-19
Attending: INTERNAL MEDICINE
Payer: MEDICARE

## 2023-04-19 DIAGNOSIS — E03.8 TOXIC DIFFUSE GOITER WITH PRETIBIAL MYXEDEMA: Primary | ICD-10-CM

## 2023-04-19 DIAGNOSIS — E05.00 TOXIC DIFFUSE GOITER WITH PRETIBIAL MYXEDEMA: Primary | ICD-10-CM

## 2023-04-19 LAB
T3FREE SERPL-MCNC: 2.78 PG/ML (ref 2.4–4.2)
T4 FREE SERPL-MCNC: 0.9 NG/DL (ref 0.8–1.7)
TSI SER-ACNC: 2.43 MIU/ML (ref 0.36–3.74)

## 2023-04-19 PROCEDURE — 84439 ASSAY OF FREE THYROXINE: CPT

## 2023-04-19 PROCEDURE — 84443 ASSAY THYROID STIM HORMONE: CPT

## 2023-04-19 PROCEDURE — 36415 COLL VENOUS BLD VENIPUNCTURE: CPT

## 2023-04-19 PROCEDURE — 84481 FREE ASSAY (FT-3): CPT

## 2023-07-26 ENCOUNTER — OFFICE VISIT (OUTPATIENT)
Dept: INTERNAL MEDICINE CLINIC | Facility: CLINIC | Age: 53
End: 2023-07-26
Payer: MEDICARE

## 2023-07-26 VITALS
WEIGHT: 101 LBS | DIASTOLIC BLOOD PRESSURE: 64 MMHG | BODY MASS INDEX: 20.36 KG/M2 | OXYGEN SATURATION: 98 % | HEIGHT: 59 IN | SYSTOLIC BLOOD PRESSURE: 102 MMHG | HEART RATE: 82 BPM

## 2023-07-26 DIAGNOSIS — H61.23 BILATERAL IMPACTED CERUMEN: ICD-10-CM

## 2023-07-26 DIAGNOSIS — F42.4 SKIN-PICKING DISORDER: ICD-10-CM

## 2023-07-26 DIAGNOSIS — Q90.9 DOWN SYNDROME: Primary | ICD-10-CM

## 2023-07-26 DIAGNOSIS — Q23.1 BICUSPID AORTIC VALVE: ICD-10-CM

## 2023-07-26 DIAGNOSIS — E03.8 SUBCLINICAL HYPOTHYROIDISM: ICD-10-CM

## 2023-07-26 PROBLEM — R55 SYNCOPE, NEAR: Status: RESOLVED | Noted: 2022-11-29 | Resolved: 2023-07-26

## 2023-07-26 PROBLEM — Q23.81 BICUSPID AORTIC VALVE: Status: ACTIVE | Noted: 2023-07-26

## 2023-07-26 PROCEDURE — 99203 OFFICE O/P NEW LOW 30 MIN: CPT | Performed by: FAMILY MEDICINE

## 2023-07-26 NOTE — PATIENT INSTRUCTIONS
PATIENT INSTRUCTIONS    Thank you for seeing me today, it was a pleasure taking care of you. Please check out at the  and schedule a follow up appointment. Return if symptoms worsen or fail to improve.   Continue to follow with your PCP Dr Indra Atkinson  Can see ENT specialist for earwax - Dr Sherita Kawasaki   Can discuss with Dr Indra Atkinson about her skin picking in the future    Best,  Dr. Aguirre Him

## 2023-07-26 NOTE — ASSESSMENT & PLAN NOTE
Patient with a history of down syndrome. Follows with PCP Dr Reji aSini who is a down syndrome specialist.   Here to see me because I am more local; can see me if acute medical issues occur and cannot get in with PCP. Overall doing well at this time. Chronic medical conditions to be managed by PCP.

## 2023-07-26 NOTE — ASSESSMENT & PLAN NOTE
Patient with a history of skin picking. Overall caretaker reports that this is improved at this time. Several healed lesions throughout her body. Still occasionally picking.   Can consider SSRI moving forward - follow up with her PCP

## 2023-10-03 ENCOUNTER — E-ADVICE (OUTPATIENT)
Dept: OTHER | Age: 53
End: 2023-10-03

## 2023-10-18 ENCOUNTER — TELEPHONE (OUTPATIENT)
Dept: OTHER | Age: 53
End: 2023-10-18

## 2023-10-18 ENCOUNTER — OFFICE VISIT (OUTPATIENT)
Dept: OTHER | Age: 53
End: 2023-10-18

## 2023-10-18 VITALS
DIASTOLIC BLOOD PRESSURE: 60 MMHG | HEIGHT: 56 IN | HEART RATE: 62 BPM | OXYGEN SATURATION: 99 % | BODY MASS INDEX: 22 KG/M2 | WEIGHT: 97.8 LBS | TEMPERATURE: 98.1 F | SYSTOLIC BLOOD PRESSURE: 98 MMHG

## 2023-10-18 DIAGNOSIS — Q90.9 DOWN SYNDROME: Primary | ICD-10-CM

## 2023-10-18 DIAGNOSIS — E06.3 HYPOTHYROIDISM DUE TO HASHIMOTO'S THYROIDITIS: ICD-10-CM

## 2023-10-18 DIAGNOSIS — Z23 NEED FOR VACCINATION: ICD-10-CM

## 2023-10-18 DIAGNOSIS — L85.3 DRY SKIN: ICD-10-CM

## 2023-10-18 DIAGNOSIS — Q23.1 BICUSPID AORTIC VALVE: ICD-10-CM

## 2023-10-18 DIAGNOSIS — D75.89 MACROCYTOSIS: ICD-10-CM

## 2023-10-18 DIAGNOSIS — E03.8 HYPOTHYROIDISM DUE TO HASHIMOTO'S THYROIDITIS: ICD-10-CM

## 2023-10-18 DIAGNOSIS — L85.9 HYPERKERATOSIS: ICD-10-CM

## 2023-10-18 DIAGNOSIS — H54.7 VISION IMPAIRMENT: ICD-10-CM

## 2023-10-18 DIAGNOSIS — E55.9 VITAMIN D DEFICIENCY: ICD-10-CM

## 2023-10-18 PROBLEM — Q23.81 BICUSPID AORTIC VALVE: Status: ACTIVE | Noted: 2023-10-18

## 2023-10-18 PROCEDURE — 90686 IIV4 VACC NO PRSV 0.5 ML IM: CPT | Performed by: FAMILY MEDICINE

## 2023-10-18 PROCEDURE — G0008 ADMIN INFLUENZA VIRUS VAC: HCPCS | Performed by: FAMILY MEDICINE

## 2023-10-18 PROCEDURE — 99214 OFFICE O/P EST MOD 30 MIN: CPT | Performed by: FAMILY MEDICINE

## 2023-10-18 PROCEDURE — G0438 PPPS, INITIAL VISIT: HCPCS | Performed by: FAMILY MEDICINE

## 2023-10-18 RX ORDER — OLOPATADINE HYDROCHLORIDE 1 MG/ML
SOLUTION/ DROPS OPHTHALMIC
COMMUNITY
Start: 2023-04-27 | End: 2023-10-18 | Stop reason: DRUGHIGH

## 2023-10-18 RX ORDER — AMOXICILLIN 500 MG/1
CAPSULE ORAL
COMMUNITY
Start: 2023-10-11

## 2023-10-18 RX ORDER — AMMONIUM LACTATE 12 G/100G
1 CREAM TOPICAL 2 TIMES DAILY PRN
Qty: 385 G | Refills: 3 | Status: SHIPPED | OUTPATIENT
Start: 2023-10-18

## 2023-10-18 ASSESSMENT — ACTIVITIES OF DAILY LIVING (ADL)
RECENT_DECLINE_ADL: NO
CONTINENCE: INDEPENDENT
ADL_SHORT_OF_BREATH: NO
TOILETING: INDEPENDENT
ADL_BEFORE_ADMISSION: NEEDS/REQUIRES ASSISTANCE
TRANSFERRING: INDEPENDENT
FEEDING: INDEPENDENT
BATHING: INDEPENDENT
ADL_SCORE: 12
DRESSING: INDEPENDENT

## 2023-10-18 ASSESSMENT — COGNITIVE AND FUNCTIONAL STATUS - GENERAL
ARE YOU BLIND OR DO YOU HAVE SERIOUS DIFFICULTY SEEING, EVEN WHEN WEARING GLASSES: NO
ARE YOU DEAF OR DO YOU HAVE SERIOUS DIFFICULTY  HEARING: NO

## 2023-10-18 ASSESSMENT — PATIENT HEALTH QUESTIONNAIRE - PHQ9
IS THE PATIENT ABLE TO COGNITIVELY COMPLETE THE PHQ9: NO
IS PATIENT ABLE TO COMPLETE PHQ2 OR PHQ9: LEARNING/COGNITIVE DISABILITY

## 2024-01-18 ENCOUNTER — TELEPHONE (OUTPATIENT)
Dept: OTHER | Age: 54
End: 2024-01-18

## 2024-01-18 DIAGNOSIS — H91.93 HEARING PROBLEM OF BOTH EARS: Primary | ICD-10-CM

## 2024-01-18 DIAGNOSIS — H61.23 BILATERAL IMPACTED CERUMEN: ICD-10-CM

## 2024-01-30 ENCOUNTER — LAB ENCOUNTER (OUTPATIENT)
Dept: LAB | Facility: HOSPITAL | Age: 54
End: 2024-01-30
Attending: INTERNAL MEDICINE
Payer: MEDICARE

## 2024-01-30 DIAGNOSIS — E03.8 SUBCLINICAL HYPOTHYROIDISM: Primary | ICD-10-CM

## 2024-01-30 LAB
T3FREE SERPL-MCNC: 3.04 PG/ML (ref 2.4–4.2)
T4 FREE SERPL-MCNC: 1.1 NG/DL (ref 0.8–1.7)
TSI SER-ACNC: 2.81 MIU/ML (ref 0.55–4.78)

## 2024-01-30 PROCEDURE — 84443 ASSAY THYROID STIM HORMONE: CPT

## 2024-01-30 PROCEDURE — 84481 FREE ASSAY (FT-3): CPT

## 2024-01-30 PROCEDURE — 36415 COLL VENOUS BLD VENIPUNCTURE: CPT

## 2024-01-30 PROCEDURE — 84439 ASSAY OF FREE THYROXINE: CPT

## 2024-02-01 ENCOUNTER — OFFICE VISIT (OUTPATIENT)
Dept: OTOLARYNGOLOGY | Facility: CLINIC | Age: 54
End: 2024-02-01

## 2024-02-01 VITALS — BODY MASS INDEX: 20.36 KG/M2 | TEMPERATURE: 98 F | WEIGHT: 101 LBS | HEIGHT: 59 IN

## 2024-02-01 DIAGNOSIS — H60.311 ACUTE DIFFUSE OTITIS EXTERNA OF RIGHT EAR: ICD-10-CM

## 2024-02-01 DIAGNOSIS — H61.23 CERUMEN DEBRIS ON TYMPANIC MEMBRANE OF BOTH EARS: Primary | ICD-10-CM

## 2024-02-01 PROCEDURE — 99203 OFFICE O/P NEW LOW 30 MIN: CPT | Performed by: SPECIALIST

## 2024-02-01 RX ORDER — OFLOXACIN 3 MG/ML
5 SOLUTION AURICULAR (OTIC) 2 TIMES DAILY
Qty: 5 ML | Refills: 0 | Status: SHIPPED | OUTPATIENT
Start: 2024-02-01 | End: 2024-02-11

## 2024-02-02 NOTE — PROGRESS NOTES
Violetta Garsia is a 53 year old female.   Chief Complaint   Patient presents with    Ear Wax     Routine ear cleaning possibly causing hearing loss.     HPI:   Patient here with decreased hearing    Current Outpatient Medications   Medication Sig Dispense Refill    ofloxacin 0.3 % Otic Solution Place 5 drops into the right ear 2 (two) times daily for 10 days. 5 mL 0      Past Medical History:   Diagnosis Date    Down syndrome 07/26/2023      Social History:  Social History     Socioeconomic History    Marital status: Single   Tobacco Use    Smoking status: Never    Smokeless tobacco: Never   Substance and Sexual Activity    Alcohol use: No    Drug use: No    Sexual activity: Not Currently   Social History Narrative    Relationships: Aunt - Hina Adler* - caretaker    Children: None    Pets: None    School: N/A    Work: N/A    Origin: From the [place] area originally     Interests: She likes watching TV, doing puzzles.     Spiritual: Goes to [employer].             REVIEW OF SYSTEMS:   GENERAL HEALTH: feels well otherwise  GENERAL : denies fever, chills, sweats, weight loss, weight gain  SKIN: denies any unusual skin lesions or rashes  RESPIRATORY: denies shortness of breath with exertion  NEURO: denies headaches    EXAM:   Temp 97.5 °F (36.4 °C) (Tympanic)   Ht 4' 11\" (1.499 m)   Wt 101 lb (45.8 kg)   BMI 20.40 kg/m²   System Details   Skin Inspection - Normal.   Constitutional Overall appearance - Normal.   Head/Face Facial features - Normal. Eyebrows - Normal. Skull - Normal.   Eyes Conjunctiva - Right: Normal, Left: Normal. Pupil - Right: Normal, Left: Normal.    Ears Inspection - Right: Normal, Left: Normal.   Ears = bilateral cerumen occlussions.    Fully cleaned under microscope using instrumentation and suctioning.    Normal tympanic membranes.  Right otitis externa fully suction.   Nasal External nose - Normal.      Oral/Oropharynx Lips - Normal   Neck Exam Inspection - Normal. Palpation - Normal.  Parotid gland - Normal. Thyroid gland - Normal.   Lymph Detail Submental. Submandibular. Anterior cervical. Posterior cervical. Supraclavicular all without enlargement   Psychiatric Orientation - Oriented to time, place, person & situation. Appropriate mood and affect.   Neurological Memory - Normal. Cranial nerves - Cranial nerves II through XII grossly intact.     ASSESSMENT AND PLAN:   1. Cerumen debris on tympanic membrane of both ears  Fully cleaned.    2. Acute diffuse otitis externa of right ear  Placed on Floxin drops.  Follow-up in 3 weeks time, sooner if problems.  Will recommend audiogram and return if hearing loss is persistent and infection is clear.      The patient indicates understanding of these issues and agrees to the plan.      Milvia Johnson MD  2/1/2024  8:55 PM

## 2024-02-02 NOTE — PATIENT INSTRUCTIONS
Your ears were fully cleaned of cerumen.  You had a right otitis externa which was fully suction.  You were placed on Floxin drops.  Follow-up in 3 weeks time, sooner if problems.  An audiogram will be done on your return if your hearing loss persist after the infection is clear

## 2024-02-21 ENCOUNTER — OFFICE VISIT (OUTPATIENT)
Dept: AUDIOLOGY | Facility: CLINIC | Age: 54
End: 2024-02-21
Payer: MEDICARE

## 2024-02-21 ENCOUNTER — OFFICE VISIT (OUTPATIENT)
Dept: OTOLARYNGOLOGY | Facility: CLINIC | Age: 54
End: 2024-02-21

## 2024-02-21 VITALS — BODY MASS INDEX: 19.15 KG/M2 | HEIGHT: 59 IN | WEIGHT: 95 LBS

## 2024-02-21 DIAGNOSIS — H90.3 SENSORINEURAL HEARING LOSS, BILATERAL: Primary | ICD-10-CM

## 2024-02-21 DIAGNOSIS — H90.3 BILATERAL SENSORINEURAL HEARING LOSS: ICD-10-CM

## 2024-02-21 DIAGNOSIS — R01.1 SYSTOLIC EJECTION MURMUR: ICD-10-CM

## 2024-02-21 DIAGNOSIS — H60.311 ACUTE DIFFUSE OTITIS EXTERNA OF RIGHT EAR: ICD-10-CM

## 2024-02-21 DIAGNOSIS — H93.13 TINNITUS OF BOTH EARS: Primary | ICD-10-CM

## 2024-02-21 PROCEDURE — 92567 TYMPANOMETRY: CPT | Performed by: AUDIOLOGIST

## 2024-02-21 PROCEDURE — 92557 COMPREHENSIVE HEARING TEST: CPT | Performed by: AUDIOLOGIST

## 2024-02-21 PROCEDURE — 99213 OFFICE O/P EST LOW 20 MIN: CPT | Performed by: SPECIALIST

## 2024-02-21 RX ORDER — AMMONIUM LACTATE 12 G/100G
1 CREAM TOPICAL
COMMUNITY
Start: 2023-10-18

## 2024-02-21 RX ORDER — OLOPATADINE HYDROCHLORIDE 1 MG/ML
SOLUTION/ DROPS OPHTHALMIC
COMMUNITY
Start: 2023-04-27

## 2024-02-22 NOTE — PROGRESS NOTES
Violetta Garsia is a 53 year old female.   Chief Complaint   Patient presents with    Follow - Up     Patient is her for acute of right ear follow up and hearing test.     HPI:   Patient here for follow-up on her left otitis externa.  Feels better after Floxin drops.    Current Outpatient Medications   Medication Sig Dispense Refill    Ammonium Lactate 12 % External Cream Apply 1 Application topically.      olopatadine (PATADAY) 0.1 % Ophthalmic Solution INSTILL 1 DROP INTO AFFECTED EYE(S) BY OPHTHALMIC ROUTE 2 TIMES PER DAY        Past Medical History:   Diagnosis Date    Down syndrome (HCC) 07/26/2023      Social History:  Social History     Socioeconomic History    Marital status: Single   Tobacco Use    Smoking status: Never    Smokeless tobacco: Never   Substance and Sexual Activity    Alcohol use: No    Drug use: No    Sexual activity: Not Currently   Social History Narrative    Relationships: Aunt - Hina Adler* - caretaker    Children: None    Pets: None    School: N/A    Work: N/A    Origin: From the [place] area originally     Interests: She likes watching TV, doing puzzles.     Spiritual: Goes to [employer].             REVIEW OF SYSTEMS:   GENERAL HEALTH: feels well otherwise  GENERAL : denies fever, chills, sweats, weight loss, weight gain  SKIN: denies any unusual skin lesions or rashes  RESPIRATORY: denies shortness of breath with exertion  NEURO: denies headaches    EXAM:   Ht 4' 11\" (1.499 m)   Wt 95 lb (43.1 kg)   BMI 19.19 kg/m²   System Details   Skin Inspection - Normal.   Constitutional Overall appearance - Normal.   Head/Face Facial features - Normal. Eyebrows - Normal. Skull - Normal.   Eyes Conjunctiva - Right: Normal, Left: Normal. Pupil - Right: Normal, Left: Normal.    Ears Inspection - Right: Normal, Left: Normal.   Canal - Right: Normal, Left: Normal.   TM -no further otitis externa.  Left tympanic membrane somewhat hyperinflated.   Nasal External nose - Normal.   Nasal septum -  Normal.  Turbinates - Normal.  Mucoid postnasal drip   Oral/Oropharynx Lips - Normal, Tonsils - Normal, Tongue - Normal    Neck Exam Inspection - Normal. Palpation - Normal. Parotid gland - Normal. Thyroid gland - Normal.  Transmitted carotid bruits from a systolic ejection murmur   Lymph Detail Submental. Submandibular. Anterior cervical. Posterior cervical. Supraclavicular all without enlargement   Psychiatric Orientation - Oriented to time, place, person & situation. Appropriate mood and affect.   Neurological Memory - Normal. Cranial nerves - Cranial nerves II through XII grossly intact.     ASSESSMENT AND PLAN:   1. Tinnitus of both ears  Audiogram shows bilateral mild to moderate sensorineural hearing loss.  Patient to consider binaural hearing aids.  She was given a medical clearance and a page of dispensers for her insurance.  Although right mammogram flat.  No perforation seen on the date of the visit.  Also no significant asymmetric loss.  - Audiology Referral - [place] ([facility])    2. Acute diffuse otitis externa of right ear  Solved after Floxin drops.    3. Bilateral sensorineural hearing loss  As above.    4.  Systolic ejection murmur  Auscultated in the neck as above    Patient to follow-up in 4 -6 months time to get her ears cleaned, sooner if problems.      The patient indicates understanding of these issues and agrees to the plan.      Milvia Johnson MD  2/21/2024  8:13 PM

## 2024-02-22 NOTE — PATIENT INSTRUCTIONS
No further right otitis externa.  An audiogram shows bilateral mild to moderate hearing loss.  You can certainly consider binaural hearing aids.  Although left tympanic membrane flat no perforation was seen on the day to your visit.  Follow-up in 4 to 6 months time, sooner if problems.  For ear cleaning

## 2024-09-07 ENCOUNTER — HOSPITAL ENCOUNTER (OUTPATIENT)
Age: 54
Discharge: HOME | End: 2024-09-07
Payer: MEDICARE

## 2024-09-07 VITALS
HEART RATE: 62 BPM | OXYGEN SATURATION: 100 % | SYSTOLIC BLOOD PRESSURE: 113 MMHG | TEMPERATURE: 98 F | RESPIRATION RATE: 20 BRPM | DIASTOLIC BLOOD PRESSURE: 62 MMHG

## 2024-09-07 DIAGNOSIS — J06.9 UPPER RESPIRATORY INFECTION WITH COUGH AND CONGESTION: ICD-10-CM

## 2024-09-07 DIAGNOSIS — H10.9 BACTERIAL CONJUNCTIVITIS: Primary | ICD-10-CM

## 2024-09-07 PROCEDURE — 99213 OFFICE O/P EST LOW 20 MIN: CPT

## 2024-09-07 RX ORDER — BENZONATATE 100 MG/1
100 CAPSULE ORAL 3 TIMES DAILY PRN
Qty: 30 CAPSULE | Refills: 0 | Status: SHIPPED | OUTPATIENT
Start: 2024-09-07 | End: 2024-10-07

## 2024-09-07 RX ORDER — OFLOXACIN 3 MG/ML
2 SOLUTION/ DROPS OPHTHALMIC 4 TIMES DAILY
Qty: 10 ML | Refills: 0 | Status: SHIPPED | OUTPATIENT
Start: 2024-09-07 | End: 2024-09-14

## 2024-09-07 NOTE — ED INITIAL ASSESSMENT (HPI)
Presents with aunt and caregiver for evaluation of right eye and \"cold\" symptoms. States congestion and cough started 2 weeks ago. Intermittent fever. Now with redness and crusty drainage to right eye.

## 2024-09-07 NOTE — DISCHARGE INSTRUCTIONS
Wash hands before and after placing drops, and often if she is touching face/eyes.     Use drops as directed to both eyes. Finish full course x 7 days.  Warm compresses to eyes a few times a day, that have discharge to help with discharge, irritation.  Disinfect common surfaces, light switches, door handles, kitchen/bath to help prevent spread of infection to others.  Viral infections are usually the worst days 3-5, but can last up to 14 days.  You may develop or continue to cough for up to 3 weeks after.  Viral infections do not improve with the use of antibiotics.  Jennings diet, increase water intake; gatorade or pedialyte   Runny Nose/Congestion:  Humidifier at bedside   Vicks vaporub under nose and chest wall  - Antihistamine (Allegra, Zyrtec, Claritin) once daily for runny nose, itchy water eyes  Cough:  Tessalon perles three times a day  - Warm tea w/honey  - Humidifier in bedroom at night cool mist to help with cough, congestion.  Sore Throat:  - Ibuprofen or tylenol every 6-8 hours as needed for pain  Fever:  Tylenol or Motrin every 6 hours for fever > 100.4. You can alternate between the two as well if fever high.  Follow up with your primary care provider in the next 2-3 days.  Go to the emergency room with:  - Fever > 102 that does not respond to medications.  - Shortness of breath, difficulty breathing.  - Chest pain.  - Vomiting and unable to keep down fluids or medications

## 2024-09-07 NOTE — ED PROVIDER NOTES
Patient Seen in: [facility]      History     Chief Complaint   Patient presents with    Eye Problem    Cough/URI     Stated Complaint: flu like symptoms/ possible pink eye    Subjective:   HPI    54 yr old female here with Aunt and caregiver for right eye discharge, redness and irritation x 2 days. Pt has had runny nose, congestion, cough, fever x 2 weeks, improved symptoms after 1st week. Now with continued cough and chest congestion. Denies vomiting, diarrhea, altered mental status per family.     Objective:   Past Medical History:    Down syndrome (HCC)              History reviewed. No pertinent surgical history.             Social History     Socioeconomic History    Marital status: Single   Tobacco Use    Smoking status: Never    Smokeless tobacco: Never   Substance and Sexual Activity    Alcohol use: No    Drug use: No    Sexual activity: Not Currently   Social History Narrative    Relationships: Aunt - Hina Adler* - caretaker    Children: None    Pets: None    School: N/A    Work: N/A    Origin: From the [place] area originally     Interests: She likes watching TV, doing puzzles.     Spiritual: Goes to [employer].                   Review of Systems    Positive for stated Chief Complaint: Eye Problem and Cough/URI    Other systems are as noted in HPI.  Constitutional and vital signs reviewed.      All other systems reviewed and negative except as noted above.    Physical Exam     ED Triage Vitals [09/07/24 1530]   /62   Pulse 62   Resp 20   Temp 97.8 °F (36.6 °C)   Temp src Temporal   SpO2 100 %   O2 Device None (Room air)       Current Vitals:   Vital Signs  BP: 113/62  Pulse: 62  Resp: 20  Temp: 97.8 °F (36.6 °C)  Temp src: Temporal    Oxygen Therapy  SpO2: 100 %  O2 Device: None (Room air)            Physical Exam  Vitals and nursing note reviewed.   Constitutional:       General: She is not in acute distress.     Appearance: Normal appearance. She is not ill-appearing, toxic-appearing  or diaphoretic.   HENT:      Right Ear: Tympanic membrane, ear canal and external ear normal.      Left Ear: Tympanic membrane, ear canal and external ear normal.      Nose: Congestion and rhinorrhea present.      Mouth/Throat:      Mouth: Mucous membranes are moist.      Pharynx: Oropharynx is clear. No oropharyngeal exudate or posterior oropharyngeal erythema.   Eyes:      General: Lids are normal. Vision grossly intact. No allergic shiner, visual field deficit or scleral icterus.        Right eye: Discharge present. No hordeolum.         Left eye: No discharge or hordeolum.      Extraocular Movements: Extraocular movements intact.      Right eye: Normal extraocular motion and no nystagmus.      Left eye: Normal extraocular motion and no nystagmus.      Conjunctiva/sclera:      Right eye: Right conjunctiva is injected. No chemosis, exudate or hemorrhage.     Left eye: Left conjunctiva is not injected. No chemosis, exudate or hemorrhage.     Pupils: Pupils are equal, round, and reactive to light.   Cardiovascular:      Rate and Rhythm: Normal rate.      Pulses: Normal pulses.   Pulmonary:      Effort: Pulmonary effort is normal. No tachypnea, prolonged expiration, respiratory distress or retractions.      Breath sounds: Normal breath sounds and air entry. No stridor, decreased air movement or transmitted upper airway sounds. No decreased breath sounds, wheezing, rhonchi or rales.   Abdominal:      General: There is no distension.      Palpations: Abdomen is soft.      Tenderness: There is no abdominal tenderness. There is no guarding.   Musculoskeletal:         General: Normal range of motion.   Skin:     General: Skin is warm.   Neurological:      Mental Status: She is alert and oriented to person, place, and time.   Psychiatric:         Mood and Affect: Mood normal.         Behavior: Behavior normal.               ED Course   Labs Reviewed - No data to display                   MDM                                       Medical Decision Making      Disposition and Plan     Clinical Impression:  1. Bacterial conjunctivitis    2. Upper respiratory infection with cough and congestion         Disposition:  Discharge  9/7/2024  4:04 pm    Follow-up:  Teresa Viera MD  [address] [facility] [address]  [phone]    Schedule an appointment as soon as possible for a visit in 1 week  If symptoms worsen          Medications Prescribed:  Discharge Medication List as of 9/7/2024  4:05 PM        START taking these medications    Details   benzonatate 100 MG Oral Cap Take 1 capsule (100 mg total) by mouth 3 (three) times daily as needed for cough., Normal, Disp-30 capsule, R-0      ofloxacin 0.3 % Ophthalmic Solution Place 2 drops into both eyes 4 (four) times daily for 7 days., Normal, Disp-10 mL, R-0                                             problems/complications.                                     Medical Decision Making      Disposition and Plan     Clinical Impression:  1. Bacterial conjunctivitis    2. Upper respiratory infection with cough and congestion         Disposition:  Discharge  9/7/2024  4:04 pm    Follow-up:  Teresa Viera MD  [address] [facility] [address]  [phone]    Schedule an appointment as soon as possible for a visit in 1 week  If symptoms worsen          Medications Prescribed:  Discharge Medication List as of 9/7/2024  4:05 PM        START taking these medications    Details   benzonatate 100 MG Oral Cap Take 1 capsule (100 mg total) by mouth 3 (three) times daily as needed for cough., Normal, Disp-30 capsule, R-0      ofloxacin 0.3 % Ophthalmic Solution Place 2 drops into both eyes 4 (four) times daily for 7 days., Normal, Disp-10 mL, R-0

## 2024-09-09 ENCOUNTER — TELEPHONE (OUTPATIENT)
Dept: OTHER | Age: 54
End: 2024-09-09

## 2024-09-09 DIAGNOSIS — H10.9 BACTERIAL CONJUNCTIVITIS: Primary | ICD-10-CM

## 2024-09-16 ENCOUNTER — APPOINTMENT (OUTPATIENT)
Dept: CT IMAGING | Facility: HOSPITAL | Age: 54
End: 2024-09-16
Payer: MEDICARE

## 2024-09-16 ENCOUNTER — HOSPITAL ENCOUNTER (EMERGENCY)
Facility: HOSPITAL | Age: 54
Discharge: HOME | End: 2024-09-16
Attending: EMERGENCY MEDICINE
Payer: MEDICARE

## 2024-09-16 ENCOUNTER — APPOINTMENT (OUTPATIENT)
Dept: CT IMAGING | Facility: HOSPITAL | Age: 54
End: 2024-09-16
Attending: EMERGENCY MEDICINE
Payer: MEDICARE

## 2024-09-16 VITALS
SYSTOLIC BLOOD PRESSURE: 96 MMHG | RESPIRATION RATE: 18 BRPM | WEIGHT: 95 LBS | OXYGEN SATURATION: 100 % | DIASTOLIC BLOOD PRESSURE: 64 MMHG | BODY MASS INDEX: 19 KG/M2 | TEMPERATURE: 98 F | HEART RATE: 71 BPM

## 2024-09-16 DIAGNOSIS — H10.31 ACUTE BACTERIAL CONJUNCTIVITIS OF RIGHT EYE: Primary | ICD-10-CM

## 2024-09-16 PROCEDURE — 70450 CT HEAD/BRAIN W/O DYE: CPT | Performed by: EMERGENCY MEDICINE

## 2024-09-16 PROCEDURE — 99283 EMERGENCY DEPT VISIT LOW MDM: CPT

## 2024-09-16 PROCEDURE — 99284 EMERGENCY DEPT VISIT MOD MDM: CPT

## 2024-09-16 RX ORDER — CLINDAMYCIN HCL 300 MG
300 CAPSULE ORAL 3 TIMES DAILY
Qty: 30 CAPSULE | Refills: 0 | Status: SHIPPED | OUTPATIENT
Start: 2024-09-16 | End: 2024-09-26

## 2024-09-16 RX ORDER — TETRACAINE HYDROCHLORIDE 5 MG/ML
1 SOLUTION OPHTHALMIC ONCE
Status: COMPLETED | OUTPATIENT
Start: 2024-09-16 | End: 2024-09-16

## 2024-09-16 RX ORDER — TETRACAINE HYDROCHLORIDE 5 MG/ML
SOLUTION OPHTHALMIC
Status: COMPLETED
Start: 2024-09-16 | End: 2024-09-16

## 2024-09-16 RX ORDER — ERYTHROMYCIN 5 MG/G
1 OINTMENT OPHTHALMIC EVERY 6 HOURS
Qty: 3.5 G | Refills: 0 | Status: SHIPPED | OUTPATIENT
Start: 2024-09-16 | End: 2024-09-23

## 2024-09-16 RX ORDER — CEFDINIR 250 MG/5ML
500 POWDER, FOR SUSPENSION ORAL 2 TIMES DAILY
Qty: 200 ML | Refills: 0 | Status: SHIPPED | OUTPATIENT
Start: 2024-09-16 | End: 2024-09-26

## 2024-09-16 NOTE — ED PROVIDER NOTES
Patient Seen in: [facility] Emergency Department    History     Chief Complaint   Patient presents with    Trauma    Eye Problem       HPI    54-year-old female here with bilateral eye redness for the last 1 week and this improved on the topical antibiotic that was prescribed at urgent care.  The caregiver states that the patient has had improvement at the left eye but not at the right eye.  No significant worsening at the right eye.  Difficult discern visual acuity given baseline communication issues with her Down syndrome.  No purulent discharge.    History reviewed.   Past Medical History:    Down syndrome (HCC)       History reviewed. History reviewed. No pertinent surgical history.      Medications :  (Not in a hospital admission)       Family History   Problem Relation Age of Onset    Breast Cancer Mother     No Known Problems Father     No Known Problems Brother     Diabetes Maternal Grandmother     Prostate Cancer Maternal Grandfather     No Known Problems Paternal Grandmother     No Known Problems Paternal Grandfather        Smoking Status:   Social History     Socioeconomic History    Marital status: Single   Tobacco Use    Smoking status: Never    Smokeless tobacco: Never   Substance and Sexual Activity    Alcohol use: No    Drug use: No    Sexual activity: Not Currently       Constitutional and vital signs reviewed.      Social History and Family History elements reviewed from today, pertinent positives to the presenting problem noted.    Physical Exam     ED Triage Vitals [09/16/24 1100]   BP 96/64   Pulse 71   Resp 18   Temp 97.6 °F (36.4 °C)   Temp src Oral   SpO2 100 %   O2 Device None (Room air)       All measures to prevent infection transmission during my interaction with the patient were taken. The patient was already wearing a droplet mask on my arrival to the room. Personal protective equipment was worn throughout the duration of the exam.  Handwashing was performed prior to and after  the exam.  Stethoscope and any equipment used during my examination was cleaned with super sani-cloth germicidal wipes following the exam.     Physical Exam    General: NAD  Head: Normocephalic and atraumatic.  Mouth/Throat/Ears/Nose: No hoarseness of voice  Eyes: Conjunctival injection at the right eye, no conjunctival injection of the left eye.  There is mild chemosis of the right eye.  Normal pupil.  There is a corneal ulcer with fluorescein uptake at the right eye.  Intraocular pressure of the right eye 19.  Extraocular movements intact  Neck: Normal range of motion. Supple.   Cardiovascular: Normal rate  Respiratory/Chest: No tachypnea.  Gastrointestinal: Nondistended  Musculoskeletal:No swelling or deformity.   Neurological: Alert and appropriate.   Skin: Skin is warm and dry. No pallor.  Psychiatric: Has a normal mood and affect.      ED Course      Labs Reviewed - No data to display    As Interpreted by me    Imaging Results Available and Reviewed while in ED: No results found.  ED Medications Administered:   Medications   tetracaine (Pontocaine) 0.5 % ophthalmic solution 1 drop (1 drop Both Eyes Given by Other 9/16/24 1235)   fluorescein sodium (Ful-Pam) 1 MG ophthalmic strip 1 strip (1 strip Both Eyes Given by Other 9/16/24 1235)   fluorescein sodium (Ful-Pam) 1 MG ophthalmic strip 1 strip (1 strip Both Eyes Given by Other 9/16/24 1235)         MDM     Vitals:    09/16/24 1100   BP: 96/64   Pulse: 71   Resp: 18   Temp: 97.6 °F (36.4 °C)   TempSrc: Oral   SpO2: 100%   Weight: 43.1 kg     *I personally reviewed and interpreted all ED vitals.    Pulse Ox: 100%, Room air, Normal       Medical Decision Making      Differential Diagnosis/ Diagnostic Considerations: Conjunctivitis, bacterial    Complicating Factors: The patient already has Down syndrome to contribute to the complexity of this ED evaluation.    I reviewed prior chart records including urgent care visit note from September 10, 2024.  Patient here  with likely right bacterial conjunctivitis, bacterial ointment provided in addition to p.o. antibiotics, possibility of slight periorbital cellulitis as well.  Discharged back to home in stable condition with caregiver.  Ophthalmology referral provided.  No clinical evidence of orbital cellulitis at this time.    Disposition and Plan     Clinical Impression:  1. Acute bacterial conjunctivitis of right eye        Disposition:  Discharge    Follow-up:  Teresa Viera MD  [address] [facility] [address]  [phone]    Schedule an appointment as soon as possible for a visit in 1 day(s)      Terrence Pineda MD  [address] [facility] [address]  [address]  [facility] [address]  [phone]    Schedule an appointment as soon as possible for a visit in 1 day(s)        Medications Prescribed:  Discharge Medication List as of 9/16/2024  3:37 PM        START taking these medications    Details   clindamycin 300 MG Oral Cap Take 1 capsule (300 mg total) by mouth 3 (three) times daily for 10 days., Normal, Disp-30 capsule, R-0      cefdinir 250 MG/5ML Oral Recon Susp Take 10 mL (500 mg total) by mouth 2 (two) times daily for 10 days., Normal, Disp-200 mL, R-0      erythromycin 5 MG/GM Ophthalmic Ointment Place 1 Application into the right eye every 6 (six) hours for 7 days., Normal, Disp-3.5 g, R-0

## 2024-09-16 NOTE — ED INITIAL ASSESSMENT (HPI)
Pt to ED at baseline for c/o unwitnessed fall this AM, hit head, denies LOC.  Pts Aunt who is legal guardian present w/ pt.  Pts Aunt reporting pt has had generalized weakness & cough/URI s/s x 1 week.  Pt also w/ c/o B/L eye infection, has been on abt, but reports no improvement.  Pt w/ hx Down's syndrome.

## 2024-10-17 ENCOUNTER — TELEPHONE (OUTPATIENT)
Dept: OTHER | Age: 54
End: 2024-10-17

## 2024-10-17 DIAGNOSIS — Q90.9 DOWN SYNDROME (CMD): Primary | ICD-10-CM

## 2024-10-17 DIAGNOSIS — H54.7 VISION IMPAIRMENT: ICD-10-CM

## 2024-10-30 ENCOUNTER — APPOINTMENT (OUTPATIENT)
Dept: OTHER | Age: 54
End: 2024-10-30

## 2024-10-30 VITALS
WEIGHT: 92 LBS | SYSTOLIC BLOOD PRESSURE: 90 MMHG | BODY MASS INDEX: 19.85 KG/M2 | OXYGEN SATURATION: 99 % | DIASTOLIC BLOOD PRESSURE: 60 MMHG | HEIGHT: 57 IN | HEART RATE: 76 BPM | TEMPERATURE: 98 F

## 2024-10-30 DIAGNOSIS — E55.9 VITAMIN D DEFICIENCY: ICD-10-CM

## 2024-10-30 DIAGNOSIS — E06.3 HYPOTHYROIDISM DUE TO HASHIMOTO THYROIDITIS: ICD-10-CM

## 2024-10-30 DIAGNOSIS — Z23 NEEDS FLU SHOT: ICD-10-CM

## 2024-10-30 DIAGNOSIS — R63.6 UNDERWEIGHT: ICD-10-CM

## 2024-10-30 DIAGNOSIS — R01.1 CARDIAC MURMUR: ICD-10-CM

## 2024-10-30 DIAGNOSIS — Q90.9 DOWN SYNDROME (CMD): Primary | ICD-10-CM

## 2024-10-30 DIAGNOSIS — H10.9 BACTERIAL CONJUNCTIVITIS: ICD-10-CM

## 2024-10-30 RX ORDER — OFLOXACIN 3 MG/ML
SOLUTION/ DROPS OPHTHALMIC
COMMUNITY
Start: 2024-10-03

## 2024-10-30 RX ORDER — ERYTHROMYCIN 5 MG/G
OINTMENT OPHTHALMIC
COMMUNITY
Start: 2024-10-23

## 2024-10-30 RX ORDER — MOXIFLOXACIN 5 MG/ML
SOLUTION/ DROPS OPHTHALMIC
COMMUNITY
Start: 2024-10-24

## 2024-11-06 ENCOUNTER — LAB ENCOUNTER (OUTPATIENT)
Dept: LAB | Facility: HOSPITAL | Age: 54
End: 2024-11-06
Attending: FAMILY MEDICINE
Payer: MEDICARE

## 2024-11-06 ENCOUNTER — EXTERNAL LAB (OUTPATIENT)
Dept: HEALTH INFORMATION MANAGEMENT | Facility: OTHER | Age: 54
End: 2024-11-06

## 2024-11-06 DIAGNOSIS — R63.6 UNDERWEIGHT: ICD-10-CM

## 2024-11-06 DIAGNOSIS — E55.9 VITAMIN D DEFICIENCY: Primary | ICD-10-CM

## 2024-11-06 DIAGNOSIS — Q90.9 DOWN SYNDROME (HCC): ICD-10-CM

## 2024-11-06 LAB
25(OH)D3+25(OH)D2 SERPL-MCNC: 14 NG/ML (ref 30–100)
ALBUMIN SERPL-MCNC: 3.8 G/DL (ref 3.2–4.8)
ALBUMIN SERPL-MCNC: 3.8 G/DL (ref 3.2–4.8)
ALBUMIN/GLOB SERPL: 1 {RATIO} (ref 1–2)
ALBUMIN/GLOB SERPL: 1 {RATIO} (ref 1–2)
ALP LIVER SERPL-CCNC: 63 U/L
ALP SERPL-CCNC: 63 U/L (ref 41–108)
ALT SERPL-CCNC: 33 U/L
ALT SERPL-CCNC: 33 U/L (ref 10–49)
ANION GAP SERPL CALC-SCNC: 3 MMOL/L (ref 0–18)
ANION GAP SERPL CALC-SCNC: 3 MMOL/L (ref 0–18)
AST SERPL-CCNC: 33 U/L
AST SERPL-CCNC: 33 U/L (ref ?–34)
BASOPHILS # BLD AUTO: 0.05 X10(3) UL (ref 0–0.2)
BASOPHILS # BLD: 0.05 X10(3) UL (ref 0–0.2)
BASOPHILS NFR BLD AUTO: 0.8 %
BASOPHILS NFR BLD: 0.8 %
BILIRUB SERPL-MCNC: 0.4 MG/DL (ref 0.3–1.2)
BILIRUB SERPL-MCNC: 0.4 MG/DL (ref 0.3–1.2)
BUN BLD-MCNC: 13 MG/DL (ref 9–23)
BUN SERPL-MCNC: 13 MG/DL (ref 9–23)
BUN/CREAT SERPL: 16.9 (ref 10–20)
BUN/CREAT SERPL: 16.9 (ref 10–20)
CALCIUM BLD-MCNC: 9.2 MG/DL (ref 8.7–10.4)
CALCIUM SERPL-MCNC: 9.2 MG/DL (ref 8.7–10.4)
CALCULATED OSMO: 293 MOSM/KG (ref 275–295)
CHLORIDE SERPL-SCNC: 105 MMOL/L (ref 98–112)
CHLORIDE SERPL-SCNC: 105 MMOL/L (ref 98–112)
CO2 SERPL-SCNC: 34 MMOL/L (ref 21–32)
CO2 SERPL-SCNC: 34 MMOL/L (ref 21–32)
CREAT BLD-MCNC: 0.77 MG/DL
CREAT SERPL-MCNC: 0.77 MG/DL (ref 0.55–1.02)
DEPRECATED RDW RBC AUTO: 53.1 FL (ref 35.1–46.3)
EGFRCR SERPLBLD CKD-EPI 2021: 91 ML/MIN/1.73M2 (ref 60–?)
EOSINOPHIL # BLD AUTO: 0.04 X10(3) UL (ref 0–0.7)
EOSINOPHIL # BLD: 0.04 X10(3) UL (ref 0–0.7)
EOSINOPHIL NFR BLD AUTO: 0.6 %
EOSINOPHIL NFR BLD: 0.6 %
ERYTHROCYTE [DISTWIDTH] IN BLOOD BY AUTOMATED COUNT: 15.7 % (ref 11–15)
ERYTHROCYTE [DISTWIDTH] IN BLOOD BY AUTOMATED COUNT: 53.1 FL (ref 35.1–46.3)
ERYTHROCYTE [DISTWIDTH] IN BLOOD: 15.7 % (ref 11–15)
FASTING STATUS PATIENT QL REPORTED: NO
GFR SERPLBLD SCHWARTZ-ARVRAT: 91 ML/MIN/1.73M2
GLOBULIN PLAS-MCNC: 3.9 G/DL (ref 2–3.5)
GLOBULIN SER-MCNC: 3.9 G/DL (ref 2–3.5)
GLUCOSE BLD-MCNC: 85 MG/DL (ref 70–99)
GLUCOSE SERPL-MCNC: 85 MG/DL (ref 70–99)
HCT VFR BLD AUTO: 39.9 %
HCT VFR BLD CALC: 39.9 % (ref 35–48)
HGB BLD-MCNC: 12.2 G/DL
HGB BLD-MCNC: 12.2 G/DL (ref 12–16)
IGA SERPL-MCNC: 474.9 MG/DL (ref 70–312)
IGA SERPL-MCNC: 474.9 MG/DL (ref 70–312)
IMM GRANULOCYTES # BLD AUTO: 0.01 X10(3) UL (ref 0–1)
IMM GRANULOCYTES # BLD: 0.01 X10(3) UL (ref 0–1)
IMM GRANULOCYTES NFR BLD: 0.2 %
IMM GRANULOCYTES NFR BLD: 0.2 %
LENGTH OF FAST TIME PATIENT: NO H
LYMPHOCYTES # BLD AUTO: 1.93 X10(3) UL (ref 1–4)
LYMPHOCYTES # BLD: 1.93 X10(3) UL (ref 1–4)
LYMPHOCYTES NFR BLD AUTO: 31 %
LYMPHOCYTES NFR BLD: 31 %
MCH RBC QN AUTO: 27.9 PG (ref 26–34)
MCH RBC QN AUTO: 27.9 PG (ref 26–34)
MCHC RBC AUTO-ENTMCNC: 30.6 G/DL (ref 31–37)
MCHC RBC AUTO-ENTMCNC: 30.6 G/DL (ref 31–37)
MCV RBC AUTO: 91.3 FL
MCV RBC AUTO: 91.3 FL (ref 80–100)
MONOCYTES # BLD AUTO: 0.55 X10(3) UL (ref 0.1–1)
MONOCYTES # BLD: 0.55 X10(3) UL (ref 0.1–1)
MONOCYTES NFR BLD AUTO: 8.8 %
MONOCYTES NFR BLD: 8.8 %
NEUTROPHILS # BLD AUTO: 3.64 X10 (3) UL (ref 1.5–7.7)
NEUTROPHILS # BLD AUTO: 3.64 X10(3) UL (ref 1.5–7.7)
NEUTROPHILS # BLD: 3.64 X10(3) UL (ref 1.5–7.7)
NEUTROPHILS NFR BLD AUTO: 58.6 %
NEUTROPHILS NFR BLD: 58.6 %
OSMOLALITY SERPL CALC.SUM OF ELEC: 293 MOSM/KG (ref 275–295)
PLATELET # BLD AUTO: 298 10(3)UL (ref 150–450)
PLATELET # BLD: 298 10(3)UL (ref 150–450)
POTASSIUM SERPL-SCNC: 3.9 MMOL/L (ref 3.5–5.1)
POTASSIUM SERPL-SCNC: 3.9 MMOL/L (ref 3.5–5.1)
PROT SERPL-MCNC: 7.7 G/DL (ref 5.7–8.2)
PROT SERPL-MCNC: 7.7 G/DL (ref 5.7–8.2)
RBC # BLD AUTO: 4.37 X10(6)UL
RBC # BLD: 4.37 X10(6)UL (ref 3.8–5.3)
SODIUM SERPL-SCNC: 142 MMOL/L (ref 136–145)
SODIUM SERPL-SCNC: 142 MMOL/L (ref 136–145)
VIT D+METAB SERPL-MCNC: 14 NG/ML (ref 30–100)
WBC # BLD AUTO: 6.2 X10(3) UL (ref 4–11)
WBC # BLD: 6.2 X10(3) UL (ref 4–11)

## 2024-11-06 PROCEDURE — 85025 COMPLETE CBC W/AUTO DIFF WBC: CPT

## 2024-11-06 PROCEDURE — 36415 COLL VENOUS BLD VENIPUNCTURE: CPT

## 2024-11-06 PROCEDURE — 80053 COMPREHEN METABOLIC PANEL: CPT

## 2024-11-06 PROCEDURE — 82784 ASSAY IGA/IGD/IGG/IGM EACH: CPT

## 2024-11-06 PROCEDURE — 82306 VITAMIN D 25 HYDROXY: CPT

## 2024-11-06 PROCEDURE — 86364 TISS TRNSGLTMNASE EA IG CLAS: CPT

## 2024-11-07 ENCOUNTER — TELEPHONE (OUTPATIENT)
Dept: OTHER | Age: 54
End: 2024-11-07

## 2024-11-08 LAB — TTG IGA SER-ACNC: 0.7 U/ML (ref ?–7)

## 2024-11-11 ENCOUNTER — TELEPHONE (OUTPATIENT)
Dept: OTHER | Age: 54
End: 2024-11-11

## 2024-11-13 ENCOUNTER — E-ADVICE (OUTPATIENT)
Dept: OTHER | Age: 54
End: 2024-11-13

## 2024-11-13 DIAGNOSIS — E06.3 HYPOTHYROIDISM DUE TO HASHIMOTO THYROIDITIS: Primary | ICD-10-CM

## 2024-11-18 ENCOUNTER — E-ADVICE (OUTPATIENT)
Dept: OTHER | Age: 54
End: 2024-11-18

## 2024-12-09 ENCOUNTER — E-ADVICE (OUTPATIENT)
Dept: OTHER | Age: 54
End: 2024-12-09

## 2025-03-25 ENCOUNTER — TELEPHONE (OUTPATIENT)
Dept: OTHER | Age: 55
End: 2025-03-25

## 2025-03-25 DIAGNOSIS — H91.93 HEARING PROBLEM OF BOTH EARS: Primary | ICD-10-CM

## 2025-11-05 ENCOUNTER — APPOINTMENT (OUTPATIENT)
Dept: OTHER | Age: 55
End: 2025-11-05